# Patient Record
Sex: FEMALE | Race: BLACK OR AFRICAN AMERICAN | Employment: FULL TIME | URBAN - METROPOLITAN AREA
[De-identification: names, ages, dates, MRNs, and addresses within clinical notes are randomized per-mention and may not be internally consistent; named-entity substitution may affect disease eponyms.]

---

## 2017-01-24 ENCOUNTER — OFFICE VISIT - HEALTHEAST (OUTPATIENT)
Dept: MIDWIFE SERVICES | Facility: CLINIC | Age: 27
End: 2017-01-24

## 2017-01-24 DIAGNOSIS — Z30.09 GENERAL COUNSELING FOR INITIATION OF OTHER CONTRACEPTIVE MEASURES: ICD-10-CM

## 2017-01-24 ASSESSMENT — MIFFLIN-ST. JEOR: SCORE: 1563.17

## 2017-02-24 ENCOUNTER — OFFICE VISIT - HEALTHEAST (OUTPATIENT)
Dept: FAMILY MEDICINE | Facility: CLINIC | Age: 27
End: 2017-02-24

## 2017-02-24 ENCOUNTER — COMMUNICATION - HEALTHEAST (OUTPATIENT)
Dept: INTERNAL MEDICINE | Facility: CLINIC | Age: 27
End: 2017-02-24

## 2017-02-24 ENCOUNTER — COMMUNICATION - HEALTHEAST (OUTPATIENT)
Dept: SCHEDULING | Facility: CLINIC | Age: 27
End: 2017-02-24

## 2017-02-24 DIAGNOSIS — K52.9 AGE (ACUTE GASTROENTERITIS): ICD-10-CM

## 2017-02-24 DIAGNOSIS — K52.9 GASTROENTERITIS: ICD-10-CM

## 2017-02-24 DIAGNOSIS — R11.0 NAUSEA: ICD-10-CM

## 2017-04-05 ENCOUNTER — AMBULATORY - HEALTHEAST (OUTPATIENT)
Dept: INTERNAL MEDICINE | Facility: CLINIC | Age: 27
End: 2017-04-05

## 2017-04-05 ENCOUNTER — COMMUNICATION - HEALTHEAST (OUTPATIENT)
Dept: INTERNAL MEDICINE | Facility: CLINIC | Age: 27
End: 2017-04-05

## 2017-04-05 ENCOUNTER — AMBULATORY - HEALTHEAST (OUTPATIENT)
Dept: LAB | Facility: CLINIC | Age: 27
End: 2017-04-05

## 2017-04-05 DIAGNOSIS — Z00.00 HEALTH CARE MAINTENANCE: ICD-10-CM

## 2017-06-09 ENCOUNTER — OFFICE VISIT - HEALTHEAST (OUTPATIENT)
Dept: INTERNAL MEDICINE | Facility: CLINIC | Age: 27
End: 2017-06-09

## 2017-06-09 DIAGNOSIS — J03.90 ACUTE TONSILLITIS: ICD-10-CM

## 2017-06-09 DIAGNOSIS — J02.9 SORE THROAT: ICD-10-CM

## 2017-06-09 ASSESSMENT — MIFFLIN-ST. JEOR: SCORE: 1522.35

## 2017-07-27 ENCOUNTER — AMBULATORY - HEALTHEAST (OUTPATIENT)
Dept: NURSING | Facility: CLINIC | Age: 27
End: 2017-07-27

## 2017-07-27 ENCOUNTER — AMBULATORY - HEALTHEAST (OUTPATIENT)
Dept: INTERNAL MEDICINE | Facility: CLINIC | Age: 27
End: 2017-07-27

## 2017-07-27 ENCOUNTER — COMMUNICATION - HEALTHEAST (OUTPATIENT)
Dept: INTERNAL MEDICINE | Facility: CLINIC | Age: 27
End: 2017-07-27

## 2017-07-27 DIAGNOSIS — Z23 IMMUNIZATION DUE: ICD-10-CM

## 2017-09-20 ENCOUNTER — OFFICE VISIT - HEALTHEAST (OUTPATIENT)
Dept: FAMILY MEDICINE | Facility: CLINIC | Age: 27
End: 2017-09-20

## 2017-09-20 DIAGNOSIS — M54.2 TENDERNESS OF NECK: ICD-10-CM

## 2017-09-20 DIAGNOSIS — R51.9 HEADACHE: ICD-10-CM

## 2017-09-20 DIAGNOSIS — M62.838 NECK MUSCLE SPASM: ICD-10-CM

## 2017-10-18 ENCOUNTER — OFFICE VISIT - HEALTHEAST (OUTPATIENT)
Dept: MIDWIFE SERVICES | Facility: CLINIC | Age: 27
End: 2017-10-18

## 2017-10-18 DIAGNOSIS — Z01.812 PRE-PROCEDURAL LABORATORY EXAMINATION: ICD-10-CM

## 2017-10-18 DIAGNOSIS — N94.2 VAGINISMUS: ICD-10-CM

## 2017-10-18 DIAGNOSIS — Z30.430 ENCOUNTER FOR IUD INSERTION: ICD-10-CM

## 2017-10-18 DIAGNOSIS — Z11.3 SCREEN FOR STD (SEXUALLY TRANSMITTED DISEASE): ICD-10-CM

## 2017-10-18 ASSESSMENT — MIFFLIN-ST. JEOR: SCORE: 1508.74

## 2017-10-20 ENCOUNTER — COMMUNICATION - HEALTHEAST (OUTPATIENT)
Dept: SCHEDULING | Facility: CLINIC | Age: 27
End: 2017-10-20

## 2017-10-22 ENCOUNTER — HEALTH MAINTENANCE LETTER (OUTPATIENT)
Age: 27
End: 2017-10-22

## 2018-03-16 ENCOUNTER — OFFICE VISIT - HEALTHEAST (OUTPATIENT)
Dept: MIDWIFE SERVICES | Facility: CLINIC | Age: 28
End: 2018-03-16

## 2018-03-16 DIAGNOSIS — F32.A DEPRESSION: ICD-10-CM

## 2018-03-16 ASSESSMENT — MIFFLIN-ST. JEOR: SCORE: 1481.98

## 2018-03-19 ENCOUNTER — COMMUNICATION - HEALTHEAST (OUTPATIENT)
Dept: MIDWIFE SERVICES | Facility: CLINIC | Age: 28
End: 2018-03-19

## 2018-03-22 ENCOUNTER — COMMUNICATION - HEALTHEAST (OUTPATIENT)
Dept: FAMILY MEDICINE | Facility: CLINIC | Age: 28
End: 2018-03-22

## 2018-03-23 ENCOUNTER — COMMUNICATION - HEALTHEAST (OUTPATIENT)
Dept: FAMILY MEDICINE | Facility: CLINIC | Age: 28
End: 2018-03-23

## 2018-05-04 ENCOUNTER — AMBULATORY - HEALTHEAST (OUTPATIENT)
Dept: MIDWIFE SERVICES | Facility: CLINIC | Age: 28
End: 2018-05-04

## 2018-05-04 ENCOUNTER — COMMUNICATION - HEALTHEAST (OUTPATIENT)
Dept: INTERNAL MEDICINE | Facility: CLINIC | Age: 28
End: 2018-05-04

## 2018-05-04 ENCOUNTER — COMMUNICATION - HEALTHEAST (OUTPATIENT)
Dept: MIDWIFE SERVICES | Facility: CLINIC | Age: 28
End: 2018-05-04

## 2018-05-04 DIAGNOSIS — F32.A DEPRESSION: ICD-10-CM

## 2018-08-24 ENCOUNTER — COMMUNICATION - HEALTHEAST (OUTPATIENT)
Dept: MIDWIFE SERVICES | Facility: CLINIC | Age: 28
End: 2018-08-24

## 2018-08-27 ENCOUNTER — OFFICE VISIT - HEALTHEAST (OUTPATIENT)
Dept: MIDWIFE SERVICES | Facility: CLINIC | Age: 28
End: 2018-08-27

## 2018-08-27 DIAGNOSIS — Z11.3 ROUTINE SCREENING FOR STI (SEXUALLY TRANSMITTED INFECTION): ICD-10-CM

## 2018-08-27 DIAGNOSIS — N89.8 VAGINAL DISCHARGE: ICD-10-CM

## 2018-08-27 DIAGNOSIS — T83.9XXA IUD COMPLICATION (H): ICD-10-CM

## 2018-08-27 DIAGNOSIS — N94.2 VAGINISMUS: ICD-10-CM

## 2018-08-27 LAB
CLUE CELLS: NORMAL
HIV 1+2 AB+HIV1 P24 AG SERPL QL IA: NEGATIVE
TRICHOMONAS, WET PREP: NORMAL
YEAST, WET PREP: NORMAL

## 2018-08-27 ASSESSMENT — MIFFLIN-ST. JEOR: SCORE: 1442.52

## 2018-08-28 LAB
C TRACH DNA SPEC QL PROBE+SIG AMP: NEGATIVE
N GONORRHOEA DNA SPEC QL NAA+PROBE: NEGATIVE
T PALLIDUM AB SER QL: NEGATIVE

## 2019-03-11 ENCOUNTER — OFFICE VISIT - HEALTHEAST (OUTPATIENT)
Dept: MIDWIFE SERVICES | Facility: CLINIC | Age: 29
End: 2019-03-11

## 2019-03-11 DIAGNOSIS — N89.8 VAGINAL DISCHARGE: ICD-10-CM

## 2019-03-11 DIAGNOSIS — Z83.3 FAMILY HISTORY OF DIABETES MELLITUS: ICD-10-CM

## 2019-03-11 LAB
CLUE CELLS: NORMAL
TRICHOMONAS, WET PREP: NORMAL
YEAST, WET PREP: NORMAL

## 2019-03-11 ASSESSMENT — MIFFLIN-ST. JEOR: SCORE: 1486.06

## 2019-03-12 LAB — HBA1C MFR BLD: 5.6 % (ref 4.2–6.1)

## 2019-12-02 ENCOUNTER — COMMUNICATION - HEALTHEAST (OUTPATIENT)
Dept: MIDWIFE SERVICES | Facility: CLINIC | Age: 29
End: 2019-12-02

## 2020-05-27 ENCOUNTER — VIRTUAL VISIT (OUTPATIENT)
Dept: FAMILY MEDICINE | Facility: OTHER | Age: 30
End: 2020-05-27

## 2020-05-28 NOTE — PROGRESS NOTES
"Date: 2020 13:37:56  Clinician: Reese Porras  Clinician NPI: [id number]  Patient: layla daily  Patient : 1990  Patient Address: [facility] [place] [address]  Patient Phone: [phone]  Visit Protocol: URI  Patient Summary:  layla is a 30 year old ( : 1990 ) female who initiated a Visit for COVID-19 (Coronavirus) evaluation and screening. When asked the question \"Please sign me up to receive news, health information and promotions. \", layla responded \"Yes\".    Her symptoms consist of malaise and myalgia.   layla denies having wheezing, nausea, teeth pain, ageusia, diarrhea, sore throat, enlarged lymph nodes, anosmia, facial pain or pressure, fever, cough, nasal congestion, vomiting, rhinitis, ear pain, headache, and chills. She also denies having recent facial or sinus surgery in the past 60 days and taking antibiotic medication for the symptoms. She is not experiencing dyspnea.   Precipitating events  She has not recently been exposed to someone with influenza. layla has been in close contact with the following high risk individuals: adults 65 or older and children under the age of 5.   Pertinent COVID-19 (Coronavirus) information  In the past 14 days, layla has not worked in a congregate living setting.   She either works or volunteers as a healthcare worker or a , or works or volunteers in a healthcare facility. 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 She lives with a healthcare worker.   layla has had a close contact with a laboratory-confirmed COVID-19 patient within 14 days of symptom onset. She was not exposed at her work. Additional information about contact with COVID-19 (Coronavirus) patient as reported by the patient (free text): My neighbor, extended family member   Pertinent medical history  layla " does not get yeast infections when she takes antibiotics.   layla needs a return to work/school note.   Weight: 154 lbs   layla does not smoke or use smokeless tobacco.   She denies pregnancy and denies breastfeeding. She has menstruated in the past month.   Weight: 154 lbs    MEDICATIONS: isotretinoin oral, ALLERGIES: NKDA  Clinician Response:  Dear layla,      Your symptoms show that you may have coronavirus (COVID-19). This illness can cause fever, cough and trouble breathing. Many people get a mild case and get better on their own. Some people can get very sick.  What should I do?  We would like to test you for this virus. This will be a curbside test done outside the clinic.  Please call [phone] to schedule your visit. Explain that you were referred by [facility] to have a COVID-19 test. Be [facility].  Starting now:  Stay at least 6 feet away from others. (If someone will drive you to your test, stay in the backseat, as far away from the  as you can.)   Don't go to work, school or anywhere else. When it's time for your test, go straight to the testing site. Don't make any stops on the way there or back.   Wash your hands and face often. Use soap and water.   Cover your mouth and nose with a mask, tissue or washcloth.   Don't touch anyone. No hugging, kissing or handshakes.  While at home   Stay home and away from others (self-isolate) until:  You've had no fever---and no medicine that reduces fever---for 3 full days (72 hours). And...  Your other symptoms have gotten better. For example, your cough or breathing has improved. And...  At least 10 days have passed since your symptoms started.  During this time:  Stay in your own room (and use your own bathroom), if you can.  Don't go to work, school or anywhere else.  Stay away from others in your home. No hugging, kissing or shaking hands.  Don't let anyone visit.  Cover your mouth and nose with a mask, tissue or washcloth to  "avoid spreading germs.  Clean \"high touch\" surfaces often (doorknobs, counters, handles, etc.). Use a household cleaning spray or wipes.  Wash your hands and face often. Use soap and water.  How can I take care of myself?  1. Get lots of rest. Drink extra fluids (unless your doctor has told you not to).  2. Take Tylenol (acetaminophen) for fever or pain. If you have liver or kidney problems, ask your family doctor if it's okay to take Tylenol.  Adults can take either:   650 mg (two 325 mg pills) every 4 to 6 hours, or...  1,000 mg (two 500 mg pills) every 8 hours as needed.   Note: Don't take more than 3,000 mg in one day.   Acetaminophen is found in many medicines (both prescribed and over-the-counter medicines). Read all labels to be sure you don't take too much.   For children, check the Tylenol bottle for the right dose. The dose is based on the child's age or weight.  3. If you have other health problems (like cancer, heart failure, an organ transplant or severe kidney disease): Call your specialty clinic if you don't feel better in the next 2 days.  4. Know when to call 911: If your breathing is so bad that it keeps you from doing normal activities, call 911 or go to the emergency room. Tell them that you've been staying home and may have COVID-19.  5. Sign up for [employer]. We know it's scary to hear that you might have COVID-19. We want to track your symptoms to make sure you're okay over the next 2 weeks. Please look for an email from [employer]---this is a free, online program that we'll use to keep in touch. To sign up, follow the link in the email. Learn more at [email or web address].  6. The following will serve as your written order for this Covid Test ordered by me for the indication of suspected Covid [Z20.828]: The test will be ordered in [employer], our electronic health record after you are scheduled and will show as ordered and authorized by Jonathan Sher MD   Order: Covid-19 (Coronavirus) " PCR for SYMPTOMATIC testing from [facility]  Where can I get more information?  To learn more about COVID-19 and how to care for yourself at home, please visit the CDC website at https://www.cdc.gov/coronavirus/2019-ncov/about/steps-when-sick.html.  For more about your care at [facility], please visit [email or web address]  If you'd like to be part of a COVID-19 clinical trial (research study) at the [facility], go to [email or web address]-clinical-trials for details.    Diagnosis: Myalgia  Diagnosis ICD: M79.1

## 2020-05-29 ENCOUNTER — AMBULATORY - HEALTHEAST (OUTPATIENT)
Dept: FAMILY MEDICINE | Facility: CLINIC | Age: 30
End: 2020-05-29

## 2020-05-29 DIAGNOSIS — Z20.822 SUSPECTED COVID-19 VIRUS INFECTION: ICD-10-CM

## 2020-06-02 ENCOUNTER — AMBULATORY - HEALTHEAST (OUTPATIENT)
Dept: FAMILY MEDICINE | Facility: CLINIC | Age: 30
End: 2020-06-02

## 2020-06-02 DIAGNOSIS — Z20.822 SUSPECTED COVID-19 VIRUS INFECTION: ICD-10-CM

## 2020-06-03 ENCOUNTER — COMMUNICATION - HEALTHEAST (OUTPATIENT)
Dept: FAMILY MEDICINE | Facility: CLINIC | Age: 30
End: 2020-06-03

## 2020-10-26 ENCOUNTER — VIRTUAL VISIT (OUTPATIENT)
Dept: FAMILY MEDICINE | Facility: OTHER | Age: 30
End: 2020-10-26

## 2020-10-26 NOTE — PROGRESS NOTES
"Date: 10/26/2020 00:13:23  Clinician: Juana Lazcano  Clinician NPI: [id number]  Patient: layla daily  Patient : 1990  Patient Address: [place] [address]  Patient Phone: [phone]  Visit Protocol: URI  Patient Summary:  layla is a 30 year old ( : 1990 ) female who initiated a [facility] Visit for COVID-19 (Coronavirus) evaluation and screening. When asked the question \"Please sign me up to receive news, health information and promotions. \", layla responded \"No\".    layla states her symptoms started 1-2 days ago.   Her symptoms consist of a headache, a cough, nasal congestion, anosmia, rhinitis, myalgia, chills, malaise, a sore throat, and ageusia.   Symptom details     Nasal secretions: The color of her mucus is white and clear.    Cough: layla coughs a few times an hour and her cough is more bothersome at night. Phlegm does not come into her throat when she coughs. She does not believe her cough is caused by post-nasal drip.     Sore throat: layla reports having moderate throat pain (4-6 on a 10 point pain scale), does not have exudate on her tonsils, and can swallow liquids. She is not sure if the lymph nodes in her neck are enlarged. A rash has not appeared on the skin since the sore throat started.     Headache: She states the headache is moderate (4-6 on a 10 point pain scale).      layla denies having ear pain, wheezing, fever, vomiting, nausea, facial pain or pressure, teeth pain, and diarrhea. She also denies taking antibiotic medication in the past month and having recent facial or sinus surgery in the past 60 days. She is not experiencing dyspnea.   Precipitating events  Within the past week, layla has not been exposed to someone with strep throat. She has not recently been exposed to someone with influenza. layla has been in close contact with the following high risk individuals: adults 65 or older, people with asthma, heart disease or diabetes, and children under the age of 5.   " Pertinent COVID-19 (Coronavirus) information  In the past 14 days, layla has not worked in a congregate living setting.   She either works or volunteers as a healthcare worker or a , or works or volunteers in a healthcare facility. 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 She lives with a healthcare worker.   layla has not had a close contact with a laboratory-confirmed COVID-19 patient within 14 days of symptom onset.   Since December 2019, layla and has not had upper respiratory infection or influenza-like illness. Has not been diagnosed with lab-confirmed COVID-19 test   Pertinent medical history  layla does not get yeast infections when she takes antibiotics.   layla needs a return to work/school note.   Weight: 165 lbs   layla does not smoke or use smokeless tobacco.   She denies pregnancy and denies breastfeeding. She has menstruated in the past month.   Weight: 165 lbs    MEDICATIONS: isotretinoin oral, ALLERGIES: NKDA  Clinician Response:  Dear layla,   Your symptoms show that you may have coronavirus (COVID-19). This illness can cause fever, cough and trouble breathing. Many people get a mild case and get better on their own. Some people can get very sick.  What should I do?  We would like to test you for this virus.   1. Please call [phone] to schedule your visit. Explain that [facility]. Be ready to share your [facility] visit ID number.  Please note that if you are assessed for Covid-19 testing and receive an order for testing from [facility], that the scheduling of your Covid test at [facility] may be delayed by three or four days or more due to limited availability for testing. Additional options for testing can be found on the [place] Covid-19 Response website. https://[place].gov/covid19/    The  "following will serve as your written order for this COVID Test, ordered by me, for the indication of suspected COVID [Z20.828]: The test will be ordered in [employer], our electronic health record, after you are scheduled. It will show as ordered and authorized by Brandon Sher MD.  Order: COVID-19 (Coronavirus) PCR for [facility]MPTOMATIC [facility].   2. When it's time for your COVID test:  Stay at least 6 feet away from others. (If someone will drive you to your test, stay in the backseat, as far away from the  as you can.)   Cover your mouth and nose with a mask, tissue or washcloth.  Go straight to the testing site. Don't make any stops on the way there or back.      3.Starting now: Stay home and away from others (self-isolate) until:   You've had no fever---and no medicine that reduces fever---for one full day (24 hours). And...   Your other symptoms have gotten better. For example, your cough or breathing has improved. And...   At least 10 days have passed since your symptoms started.       During this time, don't leave the house except for testing or medical care.   Stay in your own room, even for meals. Use your own bathroom if you can.   Stay away from others in your home. No hugging, kissing or shaking hands. No visitors.  Don't go to work, school or anywhere else.    Clean \"high touch\" surfaces often (doorknobs, counters, handles, etc.). Use a household cleaning spray or wipes. You'll find a full list of  on the EPA website: www.epa.gov/pesticide-registration/list-n-disinfectants-use-against-sars-cov-2.   Cover your mouth and nose with a mask, tissue or washcloth to avoid spreading germs.  Wash your hands and face often. Use soap and water.  Caregivers in these groups are at risk for severe illness due to COVID-19:  o People 65 years and older  o People who live in a nursing home or long-term care facility  o People with chronic disease (lung, heart, cancer, diabetes, kidney, liver, immunologic)  " o People who have a weakened immune system, including those who:   Are in cancer treatment  Take medicine that weakens the immune system, such as corticosteroids  Had a bone marrow or organ transplant  Have an immune deficiency  Have poorly controlled HIV or AIDS  Are obese (body mass index of 40 or higher)  Smoke regularly   o Caregivers should wear gloves while washing dishes, handling laundry and cleaning bedrooms and bathrooms.  o Use caution when washing and drying laundry: Don't shake dirty laundry, and use the warmest water setting that you can.  o For more tips, go to www.cdc.gov/coronavirus/2019-ncov/downloads/10Things.pdf.    4.Sign up for [employer]. We know it's scary to hear that you might have COVID-19. We want to track your symptoms to make sure you're okay over the next 2 weeks. Please look for an email from [employer]---this is a free, online program that we'll use to keep in touch. To sign up, follow the link in the email. Learn more at [email or web address]  How can I take care of myself?   Get lots of rest. Drink extra fluids (unless a doctor has told you not to).   Take Tylenol (acetaminophen) for fever or pain. If you have liver or kidney problems, ask your family doctor if it's okay to take Tylenol.   Adults can take either:    650 mg (two 325 mg pills) every 4 to 6 hours, or...   1,000 mg (two 500 mg pills) every 8 hours as needed.    Note: Don't take more than 3,000 mg in one day. Acetaminophen is found in many medicines (both prescribed and over-the-counter medicines). Read all labels to be sure you don't take too much.   For children, check the Tylenol bottle for the right dose. The dose is based on the child's age or weight.    If you have other health problems (like cancer, heart failure, an organ transplant or severe kidney disease): Call your specialty clinic if you don't feel better in the next 2 days.       Know when to call 911. Emergency warning signs include:     Trouble breathing or shortness of breath Pain or pressure in the chest that doesn't go away Feeling confused like you haven't felt before, or not being able to wake up Bluish-colored lips or face.  Where can I get more information?   [facility] -- About COVID-19: [email or web address]   CDC -- What to Do If You're Sick: www.cdc.gov/coronavirus/2019-ncov/about/steps-when-sick.html   CDC -- Ending Home Isolation: www.cdc.gov/coronavirus/2019-ncov/hcp/disposition-in-home-patients.html   CDC -- Caring for Someone: www.cdc.gov/coronavirus/2019-ncov/if-you-are-sick/care-for-someone.html   [facility] -- Interim Guidance for Hospital Discharge to Home: www.[facility].[place]/diseases/coronavirus/hcp/hospdischarge.pdf   [facility] clinical trials (COVID-19 research studies): clinicalaffairs.[facility]-clinical-trials    Below are the COVID-19 hotlines at the [place] Department of Health ([facility]). Interpreters are available.    For health questions: Call 730-624-5587 or 1-296.948.2606 (7 a.m. to 7 p.m.) For questions about schools and childcare: Call 797-470-5617 or 1-144.522.9945 (7 a.m. to 7 p.m.)    Diagnosis: Myalgia, unspecified site  Diagnosis ICD: M79.10

## 2020-10-27 DIAGNOSIS — Z20.822 SUSPECTED 2019 NOVEL CORONAVIRUS INFECTION: Primary | ICD-10-CM

## 2021-02-13 ENCOUNTER — OFFICE VISIT (OUTPATIENT)
Dept: URGENT CARE | Facility: URGENT CARE | Age: 31
End: 2021-02-13
Payer: COMMERCIAL

## 2021-02-13 VITALS
BODY MASS INDEX: 25.09 KG/M2 | OXYGEN SATURATION: 100 % | TEMPERATURE: 97.2 F | DIASTOLIC BLOOD PRESSURE: 56 MMHG | HEART RATE: 60 BPM | SYSTOLIC BLOOD PRESSURE: 104 MMHG | WEIGHT: 165 LBS

## 2021-02-13 DIAGNOSIS — H60.332 ACUTE SWIMMER'S EAR OF LEFT SIDE: Primary | ICD-10-CM

## 2021-02-13 PROCEDURE — 99203 OFFICE O/P NEW LOW 30 MIN: CPT | Performed by: PHYSICIAN ASSISTANT

## 2021-02-13 RX ORDER — NEOMYCIN SULFATE, POLYMYXIN B SULFATE, HYDROCORTISONE 3.5; 10000; 1 MG/ML; [USP'U]/ML; MG/ML
3 SOLUTION/ DROPS AURICULAR (OTIC) 4 TIMES DAILY
Qty: 3 ML | Refills: 0 | Status: SHIPPED | OUTPATIENT
Start: 2021-02-13 | End: 2021-02-18

## 2021-02-13 NOTE — PROGRESS NOTES
URGENT CARE VISIT:    SUBJECTIVE:   Pretty Thomason is a 30 year old female presenting with a chief complaint of ear pain left.  Onset was 1 day(s) ago.   She denies the following symptoms: fever, chills, stuffy nose, cough - non-productive, sore throat, vomiting and diarrhea  Course of illness is same.    Treatment measures tried include None tried with no relief of symptoms.  Predisposing factors include None.    PMH: History reviewed. No pertinent past medical history.  Allergies: Patient has no known allergies.   Medications:   Current Outpatient Medications   Medication Sig Dispense Refill     neomycin-polymyxin-hydrocortisone (CORTISPORIN) 3.5-06980-3 otic solution Place 3 drops Into the left ear 4 times daily for 5 days 3 mL 0     polyethylene glycol (MIRALAX) powder Take 17 g by mouth daily. (Patient not taking: Reported on 2/13/2021) 510 g 1     ranitidine (ZANTAC) 150 MG tablet Take 1 tablet by mouth 2 times daily. (Patient not taking: Reported on 2/13/2021) 60 tablet 1     Social History:   Social History     Tobacco Use     Smoking status: Never Smoker     Smokeless tobacco: Never Used   Substance Use Topics     Alcohol use: No       ROS:  Review of systems negative except as stated above.    OBJECTIVE:  /56   Pulse 60   Temp 97.2  F (36.2  C) (Tympanic)   Wt 74.8 kg (165 lb)   LMP 01/30/2021   SpO2 100%   Breastfeeding No   BMI 25.09 kg/m    GENERAL APPEARANCE: healthy, alert and no distress  EYES: EOMI,  PERRL, conjunctiva clear  HENT: TM's normal bilaterally and left external ear canal slightly erythematous. Left tragus TTP.  NECK: supple, nontender, no lymphadenopathy  RESP: lungs clear to auscultation - no rales, rhonchi or wheezes  CV: regular rates and rhythm, normal S1 S2, no murmur noted  SKIN: no suspicious lesions or rashes    ASSESSMENT:    ICD-10-CM    1. Acute swimmer's ear of left side  H60.332 neomycin-polymyxin-hydrocortisone (CORTISPORIN) 3.5-83564-8 otic solution        PLAN:  Patient Instructions   Patient was educated on the natural course of condition. I suspect an early otitis externa. Otitis externa occurs when the ear canal becomes inflamed. Several factors can increas your risk of developing this including swimming, wearing earbuds or hearing aids, and using q tips. Apply medication as prescribed. Conservative measures discussed including avoid using q-tips and over-the-counter analgesics (Tylenol or Ibuprofen). See your primary care provider if symptoms worsen or do not improve in 5 days. Seek emergency care if you develop severe ear pain, swelling, or redness. Patient verbalized understanding and is agreeable to plan. The patient was discharged ambulatory and in stable condition.    Paula Bernardo PA-C ....................  2/13/2021   10:58 AM

## 2021-02-13 NOTE — PATIENT INSTRUCTIONS
Patient was educated on the natural course of condition. Otitis externa occurs when the ear canal becomes inflamed. Several factors can increas your risk of developing this including swimming, wearing earbuds or hearing aids, and using q tips. Apply medication as prescribed. Conservative measures discussed including avoid using q-tips and over-the-counter analgesics (Tylenol or Ibuprofen). See your primary care provider if symptoms worsen or do not improve in 5 days. Seek emergency care if you develop severe ear pain, swelling, or redness.     Patient Education     External Ear Infection (Adult)    External otitis (also called  swimmer s ear ) is an infection in the ear canal. It's often caused by bacteria or fungus. It can occur a few days after water gets trapped in the ear canal (from swimming or bathing). It can also occur after cleaning too deeply in the ear canal with a cotton swab or other object. Sometimes, hair care products get into the ear canal and cause this problem.   Symptoms can include pain, fever, itching, redness, drainage, or swelling of the ear canal. Temporary hearing loss may also occur.   Home care    Don't try to clean the ear canal. This can push pus and bacteria deeper into the canal.    Use prescribed ear drops as directed. These help reduce swelling and fight the infection. If an ear wick was placed in the ear canal, apply drops right onto the end of the wick. The wick will draw the medicine into the ear canal even if it's swollen closed.    A cotton ball may be loosely placed in the outer ear to absorb any drainage.    You may use over-the-counter medicines to control pain as directed by the healthcare provider, unless another medicine was prescribed. Talk with your provider before using these medicines if you have chronic liver or kidney disease or ever had a stomach ulcer or digestive tract bleeding.    Don't allow water to get into your ear when bathing. Also don't swim until the  infection has cleared.    Prevention    Keep your ears dry. This helps lower the risk of infection. Dry your ears with a towel or hair dryer after getting wet. Also, use ear plugs when swimming.    Don't stick any objects in the ear to remove wax.    Talk with your provider about using ear drops to prevent swimmer's ear in case you feel water trapped in your ear canal. You can get these drops over the counter at most drugstores. They work by removing water from the ear canal.    Follow-up care  Follow up with your healthcare provider in 1 week, or as advised.   When to seek medical advice  Call your healthcare provider right away if any of these occur:     Ear pain becomes worse or doesn t improve after 3 days of treatment    Redness or swelling of the outer ear occurs or gets worse    Headache    Fever of 100.4 F (38 C) or higher, or as directed by your healthcare provider  Call 911  Call 911 or get immediate medical care if any of the following occur:     Seizure    Unusual drowsiness or confusion    Unusual painful or stiff neck    [employer] last reviewed this educational content on 8/1/2020 2000-2020 [employer]. [address] [place] [address], [place] [address]. All rights reserved. This information is not intended as a substitute for professional medical care. Always follow your healthcare professional's instructions.

## 2021-03-30 ENCOUNTER — RECORDS - HEALTHEAST (OUTPATIENT)
Dept: ADMINISTRATIVE | Facility: OTHER | Age: 31
End: 2021-03-30

## 2021-05-30 VITALS — BODY MASS INDEX: 25.92 KG/M2 | WEIGHT: 175 LBS | HEIGHT: 69 IN

## 2021-05-30 VITALS — BODY MASS INDEX: 25.81 KG/M2 | WEIGHT: 174.8 LBS

## 2021-05-31 VITALS — HEIGHT: 69 IN | WEIGHT: 163 LBS | BODY MASS INDEX: 24.14 KG/M2

## 2021-05-31 VITALS — WEIGHT: 163.8 LBS | BODY MASS INDEX: 24.19 KG/M2

## 2021-05-31 VITALS — BODY MASS INDEX: 24.59 KG/M2 | HEIGHT: 69 IN | WEIGHT: 166 LBS

## 2021-06-01 VITALS — WEIGHT: 148.4 LBS | HEIGHT: 69 IN | BODY MASS INDEX: 21.98 KG/M2

## 2021-06-01 VITALS — HEIGHT: 69 IN | WEIGHT: 157.1 LBS | BODY MASS INDEX: 23.27 KG/M2

## 2021-06-02 VITALS — HEIGHT: 69 IN | WEIGHT: 158 LBS | BODY MASS INDEX: 23.4 KG/M2
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
## 2021-06-09 NOTE — PROGRESS NOTES
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  Unfortunately, multiple members of her household,  including her 3-year-old child, developed similar symptoms after playing with a  family that also had similar symptoms.  She has not been able to eat anything for  the past 12 to 18 hours.  She is currently 4 months postpartum and is not  breastfeeding.  She does not smoke and she works as a nurse at the [employer].    OBJECTIVE:  VITAL SIGNS:  Temperature of 98.3, pulse 78, respirations 12, blood pressure 112/62.  HEENT:  Normal.  NECK:  Supple.  LUNGS:  Clear.  HEART:  Regular rhythm, normal S1, normal, S2.    ABDOMEN:  Soft, nontender.  No masses.    ASSESSMENT:  1.  Acute gastroenteritis.  Plan Zofran ODT 4 mg q.4 hours p.r.n. nausea, vomiting.  2.  Urine pregnancy test.  3.  Follow up if not improving.      ZAK RICKS MD  pa  PRESTON 02/24/2017 11:54:16  T 02/24/2017 12:14:46  R 02/24/2017 12:14:46  [id number]        cc:ZAK DONNELLY MD

## 2021-06-11 NOTE — PROGRESS NOTES
"  Office Visit - Follow Up   Pretty Thomason   27 y.o. female    Date of Visit: 6/9/2017    Chief Complaint   Patient presents with     Sore Throat     ST, hard to swallow, cough, a lot of drainage, started last night, no fever, body aches        Assessment and Plan   1. Sore throat  Rapid strep is negative.  - Rapid Strep A Screen-Throat  - Group A Strep, RNA Direct Detection, Throat    2. Acute tonsillitis  Despite preliminary rapid strep being negative I will start her on Augmentin 875/125 1 tablet twice per day for 7-10 days.  She seems to me to be at some risk for developing a tonsillar abscess.  She should return to clinic here if her prominent right-sided tonsillar pain is not resolving nicely.          No Follow-up on file.     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  She noted that at 7 PM yesterday she developed a prominent sore throat that was gradually worsening.  He reports no fevers but has had some diffuse aching.  She has no other upper respiratory symptoms.    Review of Systems: A comprehensive review of systems was negative except as noted.     Medications, Allergies and Problem List   Reviewed and updated     Physical Exam   General Appearance:       BP 90/44 (Patient Site: Left Arm, Patient Position: Sitting)  Pulse 92  Ht 5' 9\" (1.753 m)  Wt 166 lb (75.3 kg)  SpO2 98%  BMI 24.51 kg/m2    She has prominent swelling in the right tonsillar area.  She has some exudate in her tonsillar crypts.  She has a adenopathy in the right anterior cervical chain.  Rapid strep test is however negative.       Additional Information   Current Outpatient Prescriptions   Medication Sig Dispense Refill     amoxicillin-clavulanate (AUGMENTIN) 875-125 mg per tablet Take 1 tablet by mouth 2 (two) times a day for 10 days. 20 tablet 0     No current facility-administered medications for this visit.      No " Known Allergies  Social History   Substance Use Topics     Smoking status: Never Smoker     Smokeless tobacco: Never Used     Alcohol use No       Review and/or order of clinical lab tests:  Review and/or order of radiology tests:  Review and/or order of medicine tests:  Discussion of test results with performing physician:  Decision to obtain old records and/or obtain history from someone other than the patient:  Review and summarization of old records and/or obtaining history from someone other than the patient and.or discussion of case with another health care provider:  Independent visualization of image, tracing or specimen itself:    Time: total time spent with the patient was 15 minutes of which >50% was spent in counseling and coordination of care     Eusebio Vogel MD     Patient baseline mental status/Awake/Alert and oriented to person, place and time

## 2021-06-13 NOTE — PROGRESS NOTES
IUD Insertion Procedure Note    Indications: contraception    IUD Information:  Paragard Lot # [id number], Expiration date 277236.    Procedure Details   Urine pregnancy test was done and negative.  Last unprotected IC was > 2 weeks ago.  Just stopped period yesterday.  GC/CT cultures negative on 10/18/17. The risks (including infection, bleeding, pain, and uterine perforation) and benefits of the procedure were explained to the patient and Written informed consent was obtained.  Verbal pre-procedural time out was completed with patient.  Pt noted that she had vaginismus and that speculum exams so was offered lidocaine jelly at introitus which she applied herself prior to procedure.    Cervix noted to have mild ectropian.  Lidocaine jelly applied to anterior cervix. Uterus sounded to 8 cm. IUD inserted without difficulty. String visible and trimmed to 2-3 cm. Patient tolerated procedure well.    Condition:  Stable    Complications:  None    Plan:    1. Taught and encouraged to check her IUD strings monthly, ideally after menses if she has them.   2. She was advised to call for any fever or for prolonged or severe pain or bleeding. Warning signs (P-A-I-N-S) with use of IUD reviewed reviewed and h/o given regarding when to call CNM if problems or concerns.   3. She was advised to use OTC analgesics and heat as needed for discomfort.  4. Recommended condom use if IC is desire or abstinence for one week.   5. RTC in 1-2 months for routine IUD check appointment.     Jessica Regalado, DAINA, CARLEE,DAINA
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
## 2021-06-20 NOTE — LETTER
Letter by Nissen, Lynette, RN at      Author: Nissen, Lynette, RN Service: -- Author Type: --    Filed:  Encounter Date: 6/3/2020 Status: (Other)       6/3/2020        Pretty Thomason  [address]  [facility] [id number]    This letter provides a written record that you were tested for COVID-19 on 6/2/20.     Your result was negative.    This means that we didnt find the virus that causes COVID-19 in your sample. A test may show negative when you do actually have the virus. This can happen when the virus is in the early stages of infection, before you feel illness symptoms.    Even if you dont have symptoms, they may still appear. For safety, its very important to follow these rules.    Keep yourself away from others (self-isolation):      Stay home. Dont go to work, school or anywhere else.     Stay in your own room (and use your own bathroom), if you can.    Stay away from others in your home. No hugging, kissing or shaking hands. No visitors.    Clean high touch surfaces often (doorknobs, counters, handles, etc.). Use a household cleaning spray or wipes.    Cover your mouth and nose with a mask, tissue or washcloth to avoid spreading germs.    Wash your hands and face often with soap and water.    Stay in self-isolation until you meet ALL of the guidelines below:    1. You have had no fever for at least 72 hours (that is 3 full days of no fever without the use of medicine that reduces fevers), AND  2. other symptoms (such as cough, shortness of breath) have gotten better, AND  3. at least 10 days have passed since your symptoms first appeared.    Going back to work  Check with your employer for any guidelines to follow for going back to work.    Employers: This document serves as formal notice that your employee tested negative for COVID-19, as of the testing date shown above.    For questions regarding this letter or your Negative COVID-19 result, call [phone] between 8A to 6:30P (M-F) and  10A to 6:30P (weekends).
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
## 2021-06-24 NOTE — PROGRESS NOTES
Assessment:      Vulvodynia with irritation.      Plan:      Symptomatic local care discussed.  pt requests screening for DM, Hgb A1C ordered today due to family hx of DM     Subjective:       Pretty Thomason is a 28 y.o. female who presents for evaluation of an abnormal vaginal discharge. Symptoms have been present for 5 months off and on. Vaginal symptoms: burning, dyspareunia, local irritation and vulvar itching. Contraception: abstinence. She denies blisters, bumps and discharge Sexually transmitted infection risk: very low risk of STD exposure. Not currently sexually active.  Menstrual flow: regular every 28-30 days. Stopped taking Zoloft when Rx ran out because she did not make an appt with PCP. Discussed talk therapy and consider restarting Zoloft. Pt will call mental health resources available to her through her insurance plan and follow up with PCP prn if Rx needed.  PHQ9 =16 today, pt denies thoughts of self harm or harm to others. She agrees to follow up and seek talk therapy.     The following portions of the patient's history were reviewed and updated as appropriate: allergies, current medications, past family history and problem list.      Review of Systems  Pertinent items are noted in HPI.      Objective:        General Appearance:    Alert, cooperative, no distress, appears stated age   Head:    Normocephalic, without obvious abnormality, atraumatic                                           Abdomen:     Soft, non-tender   Genitalia:    Normal female without lesion, discharge or tenderness   Rectal:    Normal tone, no masses or tenderness; guaiac negative stool   Extremities:   Extremities normal, atraumatic, no cyanosis or edema       Skin:   Skin color, texture, turgor normal, no rashes or lesions       Total time spent 20 min > 50% counseling. Wendy Segal, APRN,CNM

## 2021-06-25 NOTE — PROGRESS NOTES
Progress Notes by Dany Costa DO at 9/20/2017  3:20 PM     Author: Dany Costa DO Service: -- Author Type: Physician    Filed: 9/21/2017  9:20 AM Encounter Date: 9/20/2017 Status: Signed    : Dany Costa DO (Physician)       Chief Complaint   Patient presents with   ? Dizziness     blurred vision. x 2 days   ? Headache        History of Present Illness: Nursing notes reviewed. Patient feels a pressure sensation in the front of her head swhen she bends forward. She feels like she has to work harder to focus form both eyes.  I reviewed the visual acuity screening done at time of exam.  Her vision was near normal.  She does not have a prior history of migraine headaches. She has had a frontal headache for about 2 days. No recent fevers or chills. No nasal congestion.     Review of systems: See history of present illness, otherwise negative.     Current Outpatient Prescriptions   Medication Sig Dispense Refill   ? clindamycin (CLEOCIN T) 1 % external solution      ? doxycycline (MONODOX) 100 MG capsule      ? SULFACLEANSE 8-4 8-4 % Susp      ? cyclobenzaprine (FLEXERIL) 10 MG tablet Take 1 tablet (10 mg total) by mouth 3 (three) times a day as needed for muscle spasms. 15 tablet 0     No current facility-administered medications for this visit.        Past Medical History:   Diagnosis Date   ? History of positive PPD    ? Protein S deficiency    ? Trauma    ? Vaginismus    ? Varicella       Past Surgical History:   Procedure Laterality Date   ? WISDOM TOOTH EXTRACTION        Social History     Social History   ? Marital status:      Spouse name: Ivan Chung   ? Number of children: N/A   ? Years of education: College Graduate     Occupational History   ? Registered Nurse      Social History Main Topics   ? Smoking status: Never Smoker   ? Smokeless tobacco: Never Used   ? Alcohol use No   ? Drug use: No   ? Sexual activity: Yes     Partners: Male     Birth control/ protection: None     Other  Topics Concern   ? None     Social History Narrative    This 23-year-old woman lives with her , Ivan, in [place], [place]. She is originally from [place] and moved to [place] when she was 1 years old. She lived in a refugee camp in [place] until she was 4 years old, at which point she moved to [place]. She lived in [place] for 4 years and now lives in [place]. She is a graduate of [employer] in [place] and has a degree from the [employer] in social work and an undergraduate degree from [employer] in nursing. 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 She has worked there since 2010.   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 This is a new job for her. Her , Ivan, is an  at .               History   Smoking Status   ? Never Smoker   Smokeless Tobacco   ? Never Used      Exam:   Blood pressure 94/62, pulse 62, temperature 98  F (36.7  C), temperature source Oral, resp. rate 12, weight 163 lb 12.8 oz (74.3 kg), last menstrual period 09/11/2017, SpO2 100 %, not currently breastfeeding.    EXAM:   General: Vital signs reviewed. Patient is in some distress due to headache discomfort on initial exam.  After being treated with an injection of ketorolac, she started to have relief of her headache discomfort after about 10 minutes.  She also felt her vision was better. Breathing is non labored appearing. Patient is alert and oriented x 3.   ENT: Tympanic membranes are clear and without injection bilaterally, nasal turbinates show no injection or rhinorrhea, no pharyngeal injection or exudate.  Eyes: PERRL with normal consensual gaze and normal convergence and accommodation.  Neck: supple with tender left submental adenopathy. She also has increased left cervical neck tonicity.  Heart: Normal rate and rhythm without murmur  Lungs: Clear to auscultation with good air flow  bilaterally.  Skin: warm and dry  Patient was agreeable to a rapid strep test being done to assess for strep pharyngitis as a cause for her discomfort.  Recent Results (from the past 24 hour(s))   Rapid Strep A Screen-Throat   Result Value Ref Range    Rapid Strep A Antigen No Group A Strep detected, presumptive negative No Group A Strep detected, presumptive negative    Results from exam reviewed with patient.    Assessment/Plan   1. Tenderness of neck  Rapid Strep A Screen-Throat    Group A Strep, RNA Direct Detection, Throat   2. Headache  Rapid Strep A Screen-Throat    ketorolac injection 60 mg (TORADOL)    Group A Strep, RNA Direct Detection, Throat   3. Neck muscle spasm  cyclobenzaprine (FLEXERIL) 10 MG tablet       Patient Instructions     You can use OTC ibuprofen in 6 hours after discharge, plus the cyclobenzaprine as needed. Caution that the cyclobenzaprine may cause drowsiness. I think you may have a viral illness causing your symptoms. Try to rest. Also see info below. We will notify you if the pending strep study is positive, and send a prescription to your pharmacy for treatment if it is positive. Otherwise, you can assume the test was negative if not contacted over the next 48 hours.    Self-Care for Headaches  Most headaches aren't serious and can be relieved with self-care. But some headaches may be a sign of another health problem like eye trouble or high blood pressure. To find the best treatment, learn what kind of headaches you get. For tension headaches, self-care will usually help. To treat migraines, ask your healthcare provider for advice. It is also possible to get both tension and migraine headaches. Self-care involves relieving the pain and avoiding headache triggers if you can.    Ways to reduce pain and tension  Try these steps:    Apply a cold compress or ice pack to the pain site.    Drink fluids. If nausea makes it hard to drink, try sucking on ice.    Rest. Protect yourself from  "bright light and loud noises.    Calm your emotions by imagining a peaceful scene.    Massage tight neck, shoulder, and head muscles.    To relax muscles, soak in a hot bath or use a hot shower.  Use medicines  Aspirin or aspirin substitutes, such as ibuprofen and acetaminophen, can relieve headache. Remember: Never give aspirin to anyone 18 years old or younger because of the risk of developing Reye syndrome. Use pain medicines only when necessary.  Track your headaches  Keeping a headache diary can help you and your healthcare provider identify what's causing your headaches:    Note when each headache happens.    Identify your activities and the foods you've eaten 6 to 8 hours before the headache began.    Look for any trends or \"triggers.\"  Signs of tension headache  Any of the following can be signs:    Dull pain or feeling of pressure in a tight band around your head    Pain in your neck or shoulders    Headache without a definite beginning or end    Headache after an activity such as driving or working on a computer  Signs of migraine  Any of the following can be signs:    Throbbing pain on one or both sides of your head    Nausea or vomiting    Extreme sensitivity to light, sound, and smells    Bright spots, flashes, or other visual changes    Pain or nausea so severe that you can't continue your daily activities  Call your healthcare provider   If you have any of the following symptoms, contact your healthcare provider:    A headache that lingers after a recent injury or bump to the head.    A fever with a stiff neck or pain when you bend your head toward your chest.    A headache along with slurred speech, changes in your vision, or numbness or weakness in your arms or legs.    A headache for longer than 3 days.    Frequent headaches, especially in the morning.    Headaches with seizures     Seek immediate medical attention if you have a headache that you would call \"the worst headache you have ever had.\" "   Date Last Reviewed: 10/4/2015    1262-6501 [employer]. [address] [place] [address], [place] [address]. All rights reserved. This information is not intended as a substitute for professional medical care. Always follow your healthcare professional's instructions.           Dany Costa DO

## 2021-07-14 PROBLEM — Z30.430 ENCOUNTER FOR IUD INSERTION: Status: RESOLVED | Noted: 2017-10-18 | Resolved: 2019-03-11

## 2021-07-14 PROBLEM — Z30.09 GENERAL COUNSELING FOR INITIATION OF OTHER CONTRACEPTIVE MEASURES: Status: RESOLVED | Noted: 2017-01-24 | Resolved: 2019-03-11

## 2021-08-06 ENCOUNTER — TRANSFERRED RECORDS (OUTPATIENT)
Dept: HEALTH INFORMATION MANAGEMENT | Facility: CLINIC | Age: 31
End: 2021-08-06

## 2021-08-07 ENCOUNTER — HEALTH MAINTENANCE LETTER (OUTPATIENT)
Age: 31
End: 2021-08-07

## 2021-10-02 ENCOUNTER — HEALTH MAINTENANCE LETTER (OUTPATIENT)
Age: 31
End: 2021-10-02

## 2022-04-23 ENCOUNTER — OFFICE VISIT (OUTPATIENT)
Dept: FAMILY MEDICINE | Facility: CLINIC | Age: 32
End: 2022-04-23
Payer: COMMERCIAL

## 2022-04-23 VITALS
HEART RATE: 93 BPM | TEMPERATURE: 100.5 F | SYSTOLIC BLOOD PRESSURE: 100 MMHG | WEIGHT: 150 LBS | BODY MASS INDEX: 22.15 KG/M2 | DIASTOLIC BLOOD PRESSURE: 64 MMHG | RESPIRATION RATE: 20 BRPM | OXYGEN SATURATION: 100 %

## 2022-04-23 DIAGNOSIS — J11.1 INFLUENZA-LIKE ILLNESS: Primary | ICD-10-CM

## 2022-04-23 DIAGNOSIS — R53.83 FATIGUE, UNSPECIFIED TYPE: ICD-10-CM

## 2022-04-23 DIAGNOSIS — Z20.828 EXPOSURE TO INFLUENZA: ICD-10-CM

## 2022-04-23 DIAGNOSIS — M79.10 MYALGIA: ICD-10-CM

## 2022-04-23 DIAGNOSIS — R50.9 FEVER IN ADULT: ICD-10-CM

## 2022-04-23 LAB
DEPRECATED S PYO AG THROAT QL EIA: NEGATIVE
FLUAV AG SPEC QL IA: NEGATIVE
FLUBV AG SPEC QL IA: NEGATIVE
GROUP A STREP BY PCR: NOT DETECTED

## 2022-04-23 PROCEDURE — 87804 INFLUENZA ASSAY W/OPTIC: CPT | Performed by: NURSE PRACTITIONER

## 2022-04-23 PROCEDURE — 87651 STREP A DNA AMP PROBE: CPT | Performed by: NURSE PRACTITIONER

## 2022-04-23 PROCEDURE — 99214 OFFICE O/P EST MOD 30 MIN: CPT | Performed by: NURSE PRACTITIONER

## 2022-04-23 RX ORDER — ACETAMINOPHEN 325 MG/1
975 TABLET ORAL ONCE
Status: COMPLETED | OUTPATIENT
Start: 2022-04-23 | End: 2022-04-23

## 2022-04-23 RX ORDER — OSELTAMIVIR PHOSPHATE 75 MG/1
75 CAPSULE ORAL 2 TIMES DAILY
Qty: 10 CAPSULE | Refills: 0 | Status: SHIPPED | OUTPATIENT
Start: 2022-04-23 | End: 2022-04-28

## 2022-04-23 RX ADMIN — ACETAMINOPHEN 325 MG: 325 TABLET ORAL at 10:46

## 2022-04-23 ASSESSMENT — ENCOUNTER SYMPTOMS
SHORTNESS OF BREATH: 0
APPETITE CHANGE: 1
SORE THROAT: 1
FATIGUE: 1
WHEEZING: 0
DYSURIA: 0
FEVER: 1
COUGH: 0
HEADACHES: 1
CHILLS: 1
RHINORRHEA: 1
ACTIVITY CHANGE: 1

## 2022-04-23 NOTE — PROGRESS NOTES
Patient presents with:  Cold Symptoms: Cough, sore throat, body aches, chills, fever started last night       Clinical Decision Making: Focused exam fatigue appearing patient, significant nasal congestion with postnasal drip, erythemic pharynx with mild swelling, however lung sounds clear throughout. Rapid flu negative. Rapid strep negative, culture process reviewed.     Discussed with patient given clinical presentation and exposure to influenza A with daughter at home will empirically treat with a course of Tamiflu. Discussed with patient risks and side effects of nausea and emesis symptoms and encouraged close monitoring for symptom improvement. Reviewed red flag symptoms of viral illness and when to present for reevaluation. Education provided.      ICD-10-CM    1. Influenza-like illness  J11.1 oseltamivir (TAMIFLU) 75 MG capsule   2. Exposure to influenza  Z20.828    3. Myalgia  M79.10 Influenza A & B Antigen     oseltamivir (TAMIFLU) 75 MG capsule   4. Fatigue, unspecified type  R53.83 Streptococcus A Rapid Screen w/Reflex to PCR - Clinic Collect     Group A Streptococcus PCR Throat Swab   5. Fever in adult  R50.9 acetaminophen (TYLENOL) tablet 975 mg       There are no Patient Instructions on file for this visit.    HPI: Pretty Thomason is a 32 year old female who presents today complaining of sudden onset fevers, chills, sore throat and ongoing persistent cough for the past 1 week with increased fatigue.  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  She does endorse positive influenza A exposure with young child that was recently positive.  Patient reports taking 800 mg of ibuprofen OTC overnight while at work with some limited relief.  Reports overall decreased appetite, and increased fatigue with no diarrhea or dysuria symptoms. Patient is vaccinated against COVID.    History obtained from the patient.  LMP:4/22/2022    Problem List:  2017-10: Encounter for IUD  insertion  2017: General counseling for initiation of other contraceptive   measures  2016-10: Pregnant  2016-10:  (normal spontaneous vaginal delivery)  2016-: Supervision of other normal pregnancy  -: Female fertility problem  2013: Constipation  Excessive Weight Gain In Pregnancy - Antepartum Condition Or Prior   Complicated Delivery  Second-degree Perineal Laceration During Delivery  Laceration Of Periurethral Tissue During Delivery      History reviewed. No pertinent past medical history.    Social History     Tobacco Use     Smoking status: Never Smoker     Smokeless tobacco: Never Used   Substance Use Topics     Alcohol use: No       Review of Systems   Constitutional: Positive for activity change, appetite change, chills, fatigue and fever.   HENT: Positive for congestion, rhinorrhea and sore throat.    Respiratory: Negative for cough, shortness of breath and wheezing.    Genitourinary: Negative for dysuria.   Neurological: Positive for headaches.       Vitals:    22 1015   BP: 100/64   BP Location: Left arm   Patient Position: Sitting   Cuff Size: Adult Large   Pulse: 93   Resp: 20   Temp: (!) 100.5  F (38.1  C)   TempSrc: Oral   SpO2: 100%   Weight: 68 kg (150 lb)       Physical Exam  Constitutional:       General: She is not in acute distress.     Appearance: She is ill-appearing. She is not toxic-appearing or diaphoretic.   HENT:      Head: Normocephalic and atraumatic.      Right Ear: Tympanic membrane, ear canal and external ear normal.      Left Ear: Tympanic membrane, ear canal and external ear normal.      Nose: Congestion and rhinorrhea present.      Mouth/Throat:      Mouth: Mucous membranes are moist.      Pharynx: Posterior oropharyngeal erythema present. No oropharyngeal exudate.   Eyes:      General: No scleral icterus.        Right eye: No discharge.         Left eye: No discharge.      Conjunctiva/sclera: Conjunctivae normal.      Pupils: Pupils are equal, round, and  reactive to light.   Cardiovascular:      Rate and Rhythm: Normal rate and regular rhythm.      Pulses: Normal pulses.      Heart sounds: Normal heart sounds.   Pulmonary:      Effort: Pulmonary effort is normal.      Breath sounds: Normal breath sounds.   Lymphadenopathy:      Cervical: Cervical adenopathy present.   Skin:     General: Skin is warm.      Capillary Refill: Capillary refill takes less than 2 seconds.      Findings: No rash.   Neurological:      Mental Status: She is alert and oriented to person, place, and time.   Psychiatric:         Behavior: Behavior normal.         Labs:  Results for orders placed or performed in visit on 04/23/22   Influenza A & B Antigen     Status: Normal    Specimen: Nose; Swab   Result Value Ref Range    Influenza A antigen Negative Negative    Influenza B antigen Negative Negative    Narrative    Test results must be correlated with clinical data. If necessary, results should be confirmed by a molecular assay or viral culture.   Streptococcus A Rapid Screen w/Reflex to PCR - Clinic Collect     Status: Normal    Specimen: Throat; Swab   Result Value Ref Range    Group A Strep antigen Negative Negative         At the end of the encounter, I discussed results, diagnosis, medications. Discussed red flags for immediate return to clinic/ER, as well as indications for follow up if no improvement. Patient understood and agreed to plan.     CARLEE Vincent CNP

## 2022-08-28 ENCOUNTER — HEALTH MAINTENANCE LETTER (OUTPATIENT)
Age: 32
End: 2022-08-28

## 2022-12-06 PROBLEM — K64.9 HEMORRHOIDS: Status: ACTIVE | Noted: 2022-12-06

## 2022-12-06 PROBLEM — S92.919A CLOSED FRACTURE OF ONE OR MORE PHALANGES OF FOOT: Status: ACTIVE | Noted: 2022-12-06

## 2022-12-06 PROBLEM — B07.8 OTHER VIRAL WARTS: Status: ACTIVE | Noted: 2022-12-06

## 2022-12-06 PROBLEM — M79.609 PAIN IN LIMB: Status: ACTIVE | Noted: 2022-12-06

## 2022-12-06 PROBLEM — K52.9 AGE (ACUTE GASTROENTERITIS): Status: ACTIVE | Noted: 2017-02-24

## 2022-12-06 PROBLEM — F32.A DEPRESSION: Status: ACTIVE | Noted: 2018-03-16

## 2022-12-07 ENCOUNTER — OFFICE VISIT (OUTPATIENT)
Dept: MIDWIFE SERVICES | Facility: CLINIC | Age: 32
End: 2022-12-07
Payer: COMMERCIAL

## 2022-12-07 VITALS
WEIGHT: 156 LBS | SYSTOLIC BLOOD PRESSURE: 101 MMHG | TEMPERATURE: 98.6 F | BODY MASS INDEX: 23.04 KG/M2 | DIASTOLIC BLOOD PRESSURE: 58 MMHG | HEART RATE: 60 BPM

## 2022-12-07 DIAGNOSIS — Z12.4 SCREENING FOR MALIGNANT NEOPLASM OF CERVIX: ICD-10-CM

## 2022-12-07 DIAGNOSIS — B37.31 YEAST INFECTION OF THE VAGINA: ICD-10-CM

## 2022-12-07 DIAGNOSIS — R30.0 DYSURIA: Primary | ICD-10-CM

## 2022-12-07 DIAGNOSIS — N89.8 VAGINAL IRRITATION: ICD-10-CM

## 2022-12-07 LAB
ALBUMIN UR-MCNC: NEGATIVE MG/DL
APPEARANCE UR: CLEAR
BACTERIA #/AREA URNS HPF: ABNORMAL /HPF
BILIRUB UR QL STRIP: NEGATIVE
CLUE CELLS: ABNORMAL
COLOR UR AUTO: YELLOW
GLUCOSE UR STRIP-MCNC: NEGATIVE MG/DL
HGB UR QL STRIP: ABNORMAL
KETONES UR STRIP-MCNC: NEGATIVE MG/DL
LEUKOCYTE ESTERASE UR QL STRIP: NEGATIVE
NITRATE UR QL: NEGATIVE
PH UR STRIP: 7 [PH] (ref 5–7)
RBC #/AREA URNS AUTO: ABNORMAL /HPF
SP GR UR STRIP: 1.02 (ref 1–1.03)
SQUAMOUS #/AREA URNS AUTO: ABNORMAL /LPF
TRICHOMONAS, WET PREP: ABNORMAL
UROBILINOGEN UR STRIP-ACNC: 0.2 E.U./DL
WBC #/AREA URNS AUTO: ABNORMAL /HPF
WBC'S/HIGH POWER FIELD, WET PREP: ABNORMAL
YEAST, WET PREP: ABNORMAL

## 2022-12-07 PROCEDURE — G0145 SCR C/V CYTO,THINLAYER,RESCR: HCPCS | Performed by: ADVANCED PRACTICE MIDWIFE

## 2022-12-07 PROCEDURE — 87624 HPV HI-RISK TYP POOLED RSLT: CPT | Performed by: ADVANCED PRACTICE MIDWIFE

## 2022-12-07 PROCEDURE — 87210 SMEAR WET MOUNT SALINE/INK: CPT | Performed by: ADVANCED PRACTICE MIDWIFE

## 2022-12-07 PROCEDURE — 81001 URINALYSIS AUTO W/SCOPE: CPT | Performed by: ADVANCED PRACTICE MIDWIFE

## 2022-12-07 PROCEDURE — 99203 OFFICE O/P NEW LOW 30 MIN: CPT | Performed by: ADVANCED PRACTICE MIDWIFE

## 2022-12-07 RX ORDER — MICONAZOLE NITRATE 2 %
1 CREAM WITH APPLICATOR VAGINAL AT BEDTIME
Qty: 45 G | Refills: 0 | Status: SHIPPED | OUTPATIENT
Start: 2022-12-07 | End: 2022-12-14

## 2022-12-07 NOTE — PROGRESS NOTES
"S: Pretty is a 31yo , here with complaints of burning with urination for the past 2 days, as well as burning/itching pain in her imani-area for \"a while\". She has tried warm compresses, hydration, and limiting sugar in her diet. None of these have helped her symptoms. She has a history of vaginismus. Is  and not currently sexually active; not using birth control. LMP approx 22. Last PAP was in , she agrees to have that collected today as well.    O:/58   Pulse 60   Temp 98.6  F (37  C)   Wt 70.8 kg (156 lb)   BMI 23.04 kg/m    Exam:  Constitutional: healthy, alert and no distress  Neurologic: Gait normal. Reflexes normal and symmetric. Sensation grossly WNL.  Psychiatric: mentation appears normal and affect normal/bright  Pelvic Exam:  Vulva: excoriation on external skin and surrounding introitus  Vagina: Moist, pink,thick white discharge, well rugated, no lesions  Cervix: Pap smear is taken, parous, smooth, pink, no visible lesions  Wet prep: neg yeast/clue/trich  UA: neg    A/P:  (R30.0) Dysuria  (primary encounter diagnosis)  Plan: UA with Microscopic reflex to Culture - lab         collect, Urine Microscopic    (N89.8) Vaginal irritation  Plan: Wet prep - Clinic Collect    (Z12.4) Screening for malignant neoplasm of cervix  Plan: PAP imaged thin layer screen    (B37.31) Yeast infection of the vagina  Plan: miconazole (MICATIN) 2 % cream    Discussed via mychart that it is reasonable to treat for yeast given her symptoms and the excoriation of her periarea from itching. Recommend 7 day vaginal yeast cream, with some cream applied to surrounding tissues. If no improvement, return to care.    Judson Cárdenas CNM    "

## 2022-12-09 LAB
BKR LAB AP GYN ADEQUACY: NORMAL
BKR LAB AP GYN INTERPRETATION: NORMAL
BKR LAB AP HPV REFLEX: NORMAL
BKR LAB AP PREVIOUS ABNORMAL: NORMAL
PATH REPORT.COMMENTS IMP SPEC: NORMAL
PATH REPORT.COMMENTS IMP SPEC: NORMAL
PATH REPORT.RELEVANT HX SPEC: NORMAL

## 2022-12-13 LAB
HUMAN PAPILLOMA VIRUS 16 DNA: NEGATIVE
HUMAN PAPILLOMA VIRUS 18 DNA: NEGATIVE
HUMAN PAPILLOMA VIRUS FINAL DIAGNOSIS: NORMAL
HUMAN PAPILLOMA VIRUS OTHER HR: NEGATIVE

## 2022-12-14 ENCOUNTER — OFFICE VISIT (OUTPATIENT)
Dept: INTERNAL MEDICINE | Facility: CLINIC | Age: 32
End: 2022-12-14
Payer: COMMERCIAL

## 2022-12-14 VITALS
HEART RATE: 62 BPM | TEMPERATURE: 98 F | RESPIRATION RATE: 13 BRPM | WEIGHT: 153 LBS | BODY MASS INDEX: 22.66 KG/M2 | HEIGHT: 69 IN | OXYGEN SATURATION: 100 %

## 2022-12-14 DIAGNOSIS — E55.9 VITAMIN D DEFICIENCY: ICD-10-CM

## 2022-12-14 DIAGNOSIS — Z20.5 EXPOSURE TO HEPATITIS B: ICD-10-CM

## 2022-12-14 DIAGNOSIS — Z13.220 LIPID SCREENING: ICD-10-CM

## 2022-12-14 DIAGNOSIS — R53.83 FATIGUE, UNSPECIFIED TYPE: ICD-10-CM

## 2022-12-14 DIAGNOSIS — Z00.00 ANNUAL PHYSICAL EXAM: Primary | ICD-10-CM

## 2022-12-14 LAB
ALBUMIN SERPL BCG-MCNC: 4.3 G/DL (ref 3.5–5.2)
ALP SERPL-CCNC: 48 U/L (ref 35–104)
ALT SERPL W P-5'-P-CCNC: 13 U/L (ref 10–35)
ANION GAP SERPL CALCULATED.3IONS-SCNC: 8 MMOL/L (ref 7–15)
AST SERPL W P-5'-P-CCNC: 19 U/L (ref 10–35)
BASOPHILS # BLD AUTO: 0 10E3/UL (ref 0–0.2)
BASOPHILS NFR BLD AUTO: 1 %
BILIRUB SERPL-MCNC: 0.4 MG/DL
BUN SERPL-MCNC: 11.2 MG/DL (ref 6–20)
CALCIUM SERPL-MCNC: 8.7 MG/DL (ref 8.6–10)
CHLORIDE SERPL-SCNC: 104 MMOL/L (ref 98–107)
CHOLEST SERPL-MCNC: 174 MG/DL
CREAT SERPL-MCNC: 0.76 MG/DL (ref 0.51–0.95)
DEPRECATED HCO3 PLAS-SCNC: 29 MMOL/L (ref 22–29)
EOSINOPHIL # BLD AUTO: 0.1 10E3/UL (ref 0–0.7)
EOSINOPHIL NFR BLD AUTO: 3 %
ERYTHROCYTE [DISTWIDTH] IN BLOOD BY AUTOMATED COUNT: 12.6 % (ref 10–15)
GFR SERPL CREATININE-BSD FRML MDRD: >90 ML/MIN/1.73M2
GLUCOSE SERPL-MCNC: 85 MG/DL (ref 70–99)
HCT VFR BLD AUTO: 43.9 % (ref 35–47)
HDLC SERPL-MCNC: 62 MG/DL
HGB BLD-MCNC: 13.9 G/DL (ref 11.7–15.7)
IMM GRANULOCYTES # BLD: 0 10E3/UL
IMM GRANULOCYTES NFR BLD: 0 %
LDLC SERPL CALC-MCNC: 99 MG/DL
LYMPHOCYTES # BLD AUTO: 1.6 10E3/UL (ref 0.8–5.3)
LYMPHOCYTES NFR BLD AUTO: 45 %
MCH RBC QN AUTO: 29.8 PG (ref 26.5–33)
MCHC RBC AUTO-ENTMCNC: 31.7 G/DL (ref 31.5–36.5)
MCV RBC AUTO: 94 FL (ref 78–100)
MONOCYTES # BLD AUTO: 0.3 10E3/UL (ref 0–1.3)
MONOCYTES NFR BLD AUTO: 8 %
NEUTROPHILS # BLD AUTO: 1.5 10E3/UL (ref 1.6–8.3)
NEUTROPHILS NFR BLD AUTO: 42 %
NONHDLC SERPL-MCNC: 112 MG/DL
PLATELET # BLD AUTO: 221 10E3/UL (ref 150–450)
POTASSIUM SERPL-SCNC: 4.4 MMOL/L (ref 3.4–5.3)
PROT SERPL-MCNC: 6.9 G/DL (ref 6.4–8.3)
RBC # BLD AUTO: 4.66 10E6/UL (ref 3.8–5.2)
SODIUM SERPL-SCNC: 141 MMOL/L (ref 136–145)
TRIGL SERPL-MCNC: 65 MG/DL
TSH SERPL DL<=0.005 MIU/L-ACNC: 0.94 UIU/ML (ref 0.3–4.2)
WBC # BLD AUTO: 3.5 10E3/UL (ref 4–11)

## 2022-12-14 PROCEDURE — 36415 COLL VENOUS BLD VENIPUNCTURE: CPT | Performed by: INTERNAL MEDICINE

## 2022-12-14 PROCEDURE — 80050 GENERAL HEALTH PANEL: CPT | Performed by: INTERNAL MEDICINE

## 2022-12-14 PROCEDURE — 99214 OFFICE O/P EST MOD 30 MIN: CPT | Mod: 25 | Performed by: INTERNAL MEDICINE

## 2022-12-14 PROCEDURE — 99395 PREV VISIT EST AGE 18-39: CPT | Performed by: INTERNAL MEDICINE

## 2022-12-14 PROCEDURE — 86706 HEP B SURFACE ANTIBODY: CPT | Performed by: INTERNAL MEDICINE

## 2022-12-14 PROCEDURE — 87340 HEPATITIS B SURFACE AG IA: CPT | Performed by: INTERNAL MEDICINE

## 2022-12-14 PROCEDURE — 80061 LIPID PANEL: CPT | Performed by: INTERNAL MEDICINE

## 2022-12-14 PROCEDURE — 82306 VITAMIN D 25 HYDROXY: CPT | Performed by: INTERNAL MEDICINE

## 2022-12-14 ASSESSMENT — ENCOUNTER SYMPTOMS
HEMATOCHEZIA: 0
PARESTHESIAS: 0
CONSTIPATION: 0
COUGH: 0
JOINT SWELLING: 0
SHORTNESS OF BREATH: 0
SORE THROAT: 0
ABDOMINAL PAIN: 0
CHILLS: 0
ARTHRALGIAS: 0
PALPITATIONS: 0
HEMATURIA: 0
EYE PAIN: 0
NERVOUS/ANXIOUS: 0
BREAST MASS: 0
MYALGIAS: 0
DIARRHEA: 0
DYSURIA: 0
WEAKNESS: 0
FEVER: 0
HEADACHES: 0
FREQUENCY: 0
HEARTBURN: 0
NAUSEA: 0
DIZZINESS: 0

## 2022-12-14 NOTE — PROGRESS NOTES
SUBJECTIVE:   CC: Pretty is an 32 year old who presents for preventive health visit.     Pretty is a 32-year-old female with no past medical history who is currently here for a physical.    She reports that she is always feeling tired and would like to check blood work today.  She is  and a single mom of 2 kids (6 and 8-year-old).  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  There are days when sometimes she does not sleep at all in 24 hours.  On the days when she catches up with her sleep she still feels tired.  Her menstruations are regular but light.  She has noticed that her hair is thinning out and would like to have her thyroid checked.  She does see a gynecologist and up-to-date on her gynecological exam.    Family history significant for Ms. her dad having hepatitis B and passing away from it.  Mom has hypertension and prediabetes.    Healthy Habits:     Getting at least 3 servings of Calcium per day:  NO    Bi-annual eye exam:  NO    Dental care twice a year:  NO    Sleep apnea or symptoms of sleep apnea:  None    Diet:  Regular (no restrictions)    Frequency of exercise:  None    Taking medications regularly:  Yes    Medication side effects:  Not applicable    PHQ-2 Total Score: 0    Additional concerns today:  No    Today's PHQ-2 Score:   PHQ-2 ( 1999 Pfizer) 12/14/2022   Q1: Little interest or pleasure in doing things 0   Q2: Feeling down, depressed or hopeless 0   PHQ-2 Score 0   Q1: Little interest or pleasure in doing things Not at all   Q2: Feeling down, depressed or hopeless Not at all   PHQ-2 Score 0       Have you ever done Advance Care Planning? (For example, a Health Directive, POLST, or a discussion with a medical provider or your loved ones about your wishes): No, advance care planning information given to patient to review.  Patient declined advance care planning discussion at this time.    Social History     Tobacco Use     Smoking status: Never     Smokeless  tobacco: Never   Substance Use Topics     Alcohol use: No       Alcohol Use 12/14/2022   Prescreen: >3 drinks/day or >7 drinks/week? Not Applicable     Reviewed orders with patient.  Reviewed health maintenance and updated orders accordingly -yes      Breast Cancer Screening:    Breast CA Risk Assessment (FHS-7) 12/14/2022   Do you have a family history of breast, colon, or ovarian cancer? No / Unknown     Pertinent mammograms are reviewed under the imaging tab.    History of abnormal Pap smear: No  PAP / HPV Latest Ref Rng & Units 12/7/2022 11/24/2015   PAP   Negative for Intraepithelial Lesion or Malignancy (NILM) Negative for squamous intraepithelial lesion or malignancy  Electronically signed by Mahi Lopez CT (ASCP) on 12/9/2015 at  4:01 PM     HPV16 Negative Negative -   HPV18 Negative Negative -   HRHPV Negative Negative -     Reviewed and updated as needed this visit by clinical staff   Tobacco  Allergies  Meds              Reviewed and updated as needed this visit by Provider                   Review of Systems   Constitutional: Negative for chills and fever.   HENT: Negative for congestion, ear pain, hearing loss and sore throat.    Eyes: Negative for pain and visual disturbance.   Respiratory: Negative for cough and shortness of breath.    Cardiovascular: Negative for chest pain, palpitations and peripheral edema.   Gastrointestinal: Negative for abdominal pain, constipation, diarrhea, heartburn, hematochezia and nausea.   Breasts:  Negative for tenderness, breast mass and discharge.   Genitourinary: Negative for dysuria, frequency, genital sores, hematuria, pelvic pain, urgency, vaginal bleeding and vaginal discharge.   Musculoskeletal: Negative for arthralgias, joint swelling and myalgias.   Skin: Negative for rash.   Neurological: Negative for dizziness, weakness, headaches and paresthesias.   Psychiatric/Behavioral: Negative for mood changes. The patient is not nervous/anxious.        "  OBJECTIVE:   Pulse 62   Temp 98  F (36.7  C) (Tympanic)   Resp 13   Ht 1.753 m (5' 9\")   Wt 69.4 kg (153 lb)   SpO2 100%   BMI 22.59 kg/m    Physical Exam  General: well appearing female, alert and oriented x3  EYES: Eyelids, conjunctiva, and sclera were normal. Pupils were normal.   HEAD, EARS, NOSE, MOUTH, AND THROAT: no cervical LAD, no thyromegaly or nodules appreciated. TMs are visualized and normal, oropharynx is clear.  RESPIRATORY: respirations non labored, CTA bl, no wheezes, rales, no forced expiratory wheezing.  CARDIOVASCULAR: Heart rate and rhythm were normal. No murmurs, rubs,gallops. There was no peripheral edema.   GASTROINTESTINAL: Positive bowel sounds, abdomen is soft, non tender, non distended.     MUSCULOSKELETAL: Muscle mass was normal for age. No joint synovitis or deformity.  LYMPHATIC: There were no enlarged nodes palpable.  SKIN/HAIR/NAILS: Skin color was normal.  No rashes.  NEUROLOGIC: The patient was alert and oriented.  Speech was normal.  There is no facial asymmetry.   PSYCHIATRIC:  Mood and affect were normal.   Breast exam: No axilla lymphadenopathy, breast masses or skin changes appreciated.      ASSESSMENT/PLAN:   Pretty was seen today for physical and recheck medication.    Diagnoses and all orders for this visit:    Annual physical exam  She is up-to-date on Pap smear.  We will check fasting cholesterol today.  She declined flu and COVID vaccinations.    Fatigue, unspecified type  Suspect severe sleep deprivation.  She plans to switch to dayshift at work.  We will check complete labs today to make sure not missing something else.  -     CBC with platelets and differential  -     Comprehensive metabolic panel  -     TSH with free T4 reflex    Vitamin D deficiency  -     Vitamin D Deficiency    Lipid screening  -     Lipid panel reflex to direct LDL Fasting    Exposure to hepatitis B  Dad passed away from hepatitis B.  We will screen her for hepatitis B.  -     Hepatitis B " surface antigen  -     Hepatitis B Surface Antibody          She reports that she has never smoked. She has never used smokeless tobacco.    Danika Garcia MD  [facility]

## 2022-12-15 ENCOUNTER — TELEPHONE (OUTPATIENT)
Dept: INTERNAL MEDICINE | Facility: CLINIC | Age: 32
End: 2022-12-15

## 2022-12-15 DIAGNOSIS — E55.9 VITAMIN D DEFICIENCY: Primary | ICD-10-CM

## 2022-12-15 LAB
DEPRECATED CALCIDIOL+CALCIFEROL SERPL-MC: 16 UG/L (ref 20–75)
HBV SURFACE AB SERPL IA-ACNC: 31.54 M[IU]/ML
HBV SURFACE AB SERPL IA-ACNC: REACTIVE M[IU]/ML
HBV SURFACE AG SERPL QL IA: NONREACTIVE

## 2022-12-15 RX ORDER — ERGOCALCIFEROL 1.25 MG/1
50000 CAPSULE, LIQUID FILLED ORAL WEEKLY
Qty: 12 CAPSULE | Refills: 0 | Status: SHIPPED | OUTPATIENT
Start: 2022-12-15 | End: 2023-04-03

## 2023-01-14 ENCOUNTER — HEALTH MAINTENANCE LETTER (OUTPATIENT)
Age: 33
End: 2023-01-14

## 2023-04-02 DIAGNOSIS — E55.9 VITAMIN D DEFICIENCY: ICD-10-CM

## 2023-04-03 RX ORDER — ERGOCALCIFEROL 1.25 MG/1
CAPSULE, LIQUID FILLED ORAL
Qty: 12 CAPSULE | Refills: 0 | Status: SHIPPED | OUTPATIENT
Start: 2023-04-03 | End: 2023-04-25

## 2023-04-24 ENCOUNTER — NURSE TRIAGE (OUTPATIENT)
Dept: NURSING | Facility: CLINIC | Age: 33
End: 2023-04-24

## 2023-04-24 DIAGNOSIS — Z30.9 CONTRACEPTION MANAGEMENT: Primary | ICD-10-CM

## 2023-04-25 ENCOUNTER — OFFICE VISIT (OUTPATIENT)
Dept: MIDWIFE SERVICES | Facility: CLINIC | Age: 33
End: 2023-04-25
Payer: COMMERCIAL

## 2023-04-25 VITALS
WEIGHT: 155 LBS | HEIGHT: 69 IN | SYSTOLIC BLOOD PRESSURE: 102 MMHG | DIASTOLIC BLOOD PRESSURE: 56 MMHG | BODY MASS INDEX: 22.96 KG/M2

## 2023-04-25 DIAGNOSIS — N76.0 BV (BACTERIAL VAGINOSIS): ICD-10-CM

## 2023-04-25 DIAGNOSIS — B96.89 BV (BACTERIAL VAGINOSIS): ICD-10-CM

## 2023-04-25 DIAGNOSIS — Z01.812 PRE-PROCEDURE LAB EXAM: ICD-10-CM

## 2023-04-25 DIAGNOSIS — Z30.430 ENCOUNTER FOR INSERTION OF INTRAUTERINE CONTRACEPTIVE DEVICE: Primary | ICD-10-CM

## 2023-04-25 DIAGNOSIS — N89.8 VAGINAL IRRITATION: ICD-10-CM

## 2023-04-25 LAB
CLUE CELLS: PRESENT
HCG UR QL: NEGATIVE
TRICHOMONAS, WET PREP: ABNORMAL
WBC'S/HIGH POWER FIELD, WET PREP: ABNORMAL
YEAST, WET PREP: ABNORMAL

## 2023-04-25 PROCEDURE — 58300 INSERT INTRAUTERINE DEVICE: CPT | Performed by: ADVANCED PRACTICE MIDWIFE

## 2023-04-25 PROCEDURE — 87210 SMEAR WET MOUNT SALINE/INK: CPT | Performed by: ADVANCED PRACTICE MIDWIFE

## 2023-04-25 PROCEDURE — 99213 OFFICE O/P EST LOW 20 MIN: CPT | Mod: 25 | Performed by: ADVANCED PRACTICE MIDWIFE

## 2023-04-25 PROCEDURE — 81025 URINE PREGNANCY TEST: CPT | Performed by: ADVANCED PRACTICE MIDWIFE

## 2023-04-25 RX ORDER — COPPER 313.4 MG/1
1 INTRAUTERINE DEVICE INTRAUTERINE ONCE
Status: COMPLETED
Start: 2023-04-25 | End: 2023-04-25

## 2023-04-25 RX ORDER — MULTIVIT-MIN/IRON/FOLIC ACID/K 18-600-40
CAPSULE ORAL
COMMUNITY
End: 2024-04-15

## 2023-04-25 RX ORDER — METRONIDAZOLE 500 MG/1
500 TABLET ORAL 2 TIMES DAILY
Qty: 14 TABLET | Refills: 0 | Status: SHIPPED | OUTPATIENT
Start: 2023-04-25 | End: 2023-05-02

## 2023-04-25 RX ORDER — LEVONORGESTREL 1.5 MG/1
1.5 TABLET ORAL ONCE
Qty: 1 TABLET | Refills: 0 | Status: CANCELLED
Start: 2023-04-25 | End: 2023-04-25

## 2023-04-25 RX ORDER — COPPER 313.4 MG/1
1 INTRAUTERINE DEVICE INTRAUTERINE ONCE
COMMUNITY

## 2023-04-25 RX ADMIN — COPPER 1 EACH: 313.4 INTRAUTERINE DEVICE INTRAUTERINE at 14:55

## 2023-04-25 NOTE — NURSING NOTE
Clinic Administered Medication Documentation      Intrauterine/Implant Insertion Documentation    Device was placed by provider (please see MAR for given by information). Please see MAR and medication order for additional information.     Type: Paragard   Lot # [id number]  NDC: [address]-9  Exp: 07/2028  Remove/Replace in 10 years by: 04/25/2033    Gayathri Erwin CMA on 4/25/2023 at 2:57 PM

## 2023-04-25 NOTE — TELEPHONE ENCOUNTER
See previous note. Pt called requesting Plan B.  Order pended and sent to provider for approval. Pt scheduled for IUD placement.

## 2023-04-25 NOTE — TELEPHONE ENCOUNTER
Patient is wanting to schedule an appointment for emergency contraception appointment.    Patient states on Friday had unprotected sex.  Saturday patient took a plan b  Sunday patient again had unprotected sex  Patient states she doesn't know if she can take another plan B or she read that an IUD can be placed.  Patient feels the copper IUD would be a good choice for her.      Patient is requesting either an appointment or to speak to the midwife regarding this request.    Alva Torres RN   04/24/23 9:00 PM  [facility] Nurse Advisor    Reason for Disposition    [1] Unprotected sexual intercourse AND [2] within past 72 to 120 hours (3 to 5 days)    Additional Information    Negative: Sexual abuse or rape    Negative: Vomiting > 5 times    Negative: Vaginal bleeding after recent emergency contraceptive pills    Protocols used: CONTRACEPTION - EMERGENCY-A-

## 2023-04-25 NOTE — PROGRESS NOTES
"IUD Insertion:    Subjective: Pretty Thomason is a 33 year old  presents for IUD and desires Paragard type IUD. She has used this in the past and liked the way it worked. She  so had the paragard removed and was not sexually active. She is now remarried and would like birth control again. She is using this for emergency contraception today. Discussed risks, benefits, and what to expect afterwards. She would like a wet prep done today. Wondering if she still has a yeast infection. She was tested in 2022. The results were negative but she was given cream to use externally. Discussed testing again today and treatment options we can try. No other questions or concerns at this time.     Patient has been given the opportunity to ask questions about all forms of birth control, including all options appropriate for Pretty Thomason. Discussed that no method of birth control, except abstinence is 100% effective against pregnancy or sexually transmitted infection.     Pretty Thomason understands she may have the IUD removed at any time. IUD should be removed by a health care provider.    The entire insertion procedure was reviewed with the patient, including care after placement.    Is a pregnancy test required: Yes.  Was it positive or negative?  Negative  Was a consent obtained?  Yes      No LMP recorded. Last sexual activity:  and . Used plan B . No allergy to betadine or shellfish. Patient declines STD screening  HCG Qual Urine   Date Value Ref Range Status   2013 NEGATIVE  Final     hCG Urine Qualitative   Date Value Ref Range Status   2023 Negative Negative Final     Comment:     This test is for screening purposes.  Results should be interpreted along with the clinical picture.  Confirmation testing is available if warranted by ordering GLC319, HCG Quantitative Pregnancy.         /56   Ht 1.753 m (5' 9\")   Wt 70.3 kg (155 lb)   BMI 22.89 kg/m      Pelvic " Exam:   EG/BUS: normal genital architecture without lesions, erythema or abnormal secretions.   Vagina: moist, pink, rugae with physiologic discharge and secretions  Cervix: parous no lesions and pink, moist, closed, without lesion or CMT  Uterus: anteverted position, mobile, no pain  Adnexa: within normal limits and no masses, nodularity, tenderness    Wet prep: clue cells     PROCEDURE NOTE: -- IUD Insertion    Reason for Insertion: contraception    Under sterile technique, cervix was visualized with speculum and prepped with Betadine solution swab x 3. Tenaculum was placed for stability. The uterus was gently straightened and sounded to 7.5 cm. IUD prepared for placement, and IUD inserted according to 's instructions without difficulty or significant resitance, and deployed at the fundus. The strings were visualized and trimmed to 3.0 cm from the external os. Tenaculum was removed and hemostasis noted. Speculum removed.  Patient tolerated procedure well.    Lot # [id number]  NDC: [address]-9  Exp: 07/2028    EBL: minimal    Complications: none    ASSESSMENT:     ICD-10-CM    1. Encounter for insertion of intrauterine contraceptive device  Z30.430 paragard intrauterine copper IUD device 1 each     INSERTION INTRAUTERINE DEVICE     HCG Qual, Urine ([id number])     HCG Qual, Urine ([id number])      2. Pre-procedure lab exam  Z01.812 paragard intrauterine copper device           PLAN:  (Z30.430) Encounter for insertion of intrauterine contraceptive device  (primary encounter diagnosis)  Plan: paragard intrauterine copper IUD device 1 each,        INSERTION INTRAUTERINE DEVICE, HCG Qual, Urine         ([id number])    (Z01.812) Pre-procedure lab exam  Plan: paragard intrauterine copper device    (N89.8) Vaginal irritation  Plan: Wet prep - Clinic Collect    (N76.0,  B96.89) BV (bacterial vaginosis)  Plan: metroNIDAZOLE (FLAGYL) 500 MG tablet    Given 's handouts, including when to have IUD removed, list of  danger s/sx, side effects and follow up recommended. Encouraged condom use for prevention of STD. Back up contraception advised for 7 days if progestin method. Advised to call for any fever, for prolonged or severe pain or bleeding, abnormal vaginal discharge, or unable to palpate strings. She was advised to use pain medications (ibuprofen) as needed for mild to moderate pain. Advised to follow-up in clinic in 4-6 weeks for IUD string check if unable to find strings or as directed by provider.     CARLEE PedersenM

## 2023-06-20 NOTE — PROGRESS NOTES
"SUBJECTIVE:  Pretty Thomason is a 33 year old female. She had Paragard placed 4/25/23. Right after placement she had 20 days of bleeding. Now she is having more regular cycles but they are still longer than what she is used to prior to the IUD being in place. Bleeds for about 9-10 days (heavier in the beginning and then lighter near the end). She has a 9 day stay in [place] on 7/1/23 with . Hopeful to not be on her period for this get-away as she would like to be sexually active while not on menses. They recently ! Interested in the pill or other measures to suppress or decrease amount of bleeding for this get-away. LMP: 6/1/23. She desires to continue the Paragard IUD method.  Chief Complaint   Patient presents with     Consult     Period questions related to IUD     Also complains of vulvar itching and has been using topical antifungal cream. No abnormal discharge or odor. Wondering about oral medication to treat the itching. Was treated for BV when IUD was placed.    Active diagnoses this visit:  Surveillance for birth control, intrauterine device  Uterine bleeding  Vulvar itching    Review Of Systems: negative except that which is obtained in the HPI.    Medical, surgical, OB histories, medications and allergies reviewed and updated.    OBJECTIVE:  /60 (BP Location: Right arm, Patient Position: Sitting, Cuff Size: Adult Regular)   Pulse 62   Ht 1.753 m (5' 9\")   Wt 73 kg (161 lb)   LMP 06/01/2023 (Approximate)   BMI 23.78 kg/m      Exam:  Constitutional: healthy, alert and no distress  Cardiovascular: Well perfused.  Respiratory: Breathing unlabored.  : deferred.  Pelvic exam: deferred.  Musculoskeletal: extremities normal, no gross deformities noted, gait normal and normal muscle tone  Skin: no suspicious lesions or rashes  Neurologic: Sensation grossly WNL.  Psychiatric: mentation appears normal and affect normal/bright    ASSESSMENT / PLAN:  (Z30.521) Surveillance for birth " control, intrauterine device  (primary encounter diagnosis)  (N93.9) Uterine bleeding (longer cycles with Copper IUD)  Comment: Discussed option for Ibuprofen course vs oral BC pills. She is very hopeful to suppress her menses for her trip in order to be intimate with her spouse so would prefer oral BC pills. After history review she is a candidate for COCs. Advised to start oral BC pill for one month supply and she could also begin Ibuprofen the day before her trip (600 mg every 8 hours) to suppress bleeding and cramping. Discussed hopefulness for this to suppress her menses for her trip. Advised on warning signs and when to call.  Plan: RXs sent: norethindrone-ethinyl estradiol (JUNEL FE 1/20) 1-20 MG-MCG tablet, ibuprofen (ADVIL/MOTRIN) 600 MG tablet    (L29.2) Vulvar itching  Comment: Advised on wet prep to determine if itching is due to continued BV or yeast. She prefers self-collected swab - instructed on how to obtain. Will send treatment based on results. She would prefer oral Fluconazole if it is yeast.  Plan: Wet prep - Clinic Collect (self collected).      33 minutes on the date of the encounter doing chart review, review of test results, interpretation of tests, patient visit and documentation    CARLEE Reilly, DAINA

## 2023-06-21 ENCOUNTER — OFFICE VISIT (OUTPATIENT)
Dept: MIDWIFE SERVICES | Facility: CLINIC | Age: 33
End: 2023-06-21
Payer: COMMERCIAL

## 2023-06-21 VITALS
HEART RATE: 62 BPM | WEIGHT: 161 LBS | HEIGHT: 69 IN | DIASTOLIC BLOOD PRESSURE: 60 MMHG | BODY MASS INDEX: 23.85 KG/M2 | SYSTOLIC BLOOD PRESSURE: 104 MMHG

## 2023-06-21 DIAGNOSIS — N93.9 UTERINE BLEEDING: ICD-10-CM

## 2023-06-21 DIAGNOSIS — B37.31 YEAST INFECTION OF THE VAGINA: ICD-10-CM

## 2023-06-21 DIAGNOSIS — L29.2 VULVAR ITCHING: ICD-10-CM

## 2023-06-21 DIAGNOSIS — Z30.431 SURVEILLANCE FOR BIRTH CONTROL, INTRAUTERINE DEVICE: Primary | ICD-10-CM

## 2023-06-21 LAB
CLUE CELLS: ABNORMAL
TRICHOMONAS, WET PREP: ABNORMAL
WBC'S/HIGH POWER FIELD, WET PREP: ABNORMAL
YEAST, WET PREP: PRESENT

## 2023-06-21 PROCEDURE — 99214 OFFICE O/P EST MOD 30 MIN: CPT | Performed by: ADVANCED PRACTICE MIDWIFE

## 2023-06-21 PROCEDURE — 87210 SMEAR WET MOUNT SALINE/INK: CPT | Performed by: ADVANCED PRACTICE MIDWIFE

## 2023-06-21 RX ORDER — FLUCONAZOLE 150 MG/1
150 TABLET ORAL ONCE
Qty: 1 TABLET | Refills: 0 | Status: SHIPPED | OUTPATIENT
Start: 2023-06-21 | End: 2023-06-21

## 2023-06-21 RX ORDER — IBUPROFEN 600 MG/1
600 TABLET, FILM COATED ORAL EVERY 8 HOURS PRN
Qty: 30 TABLET | Refills: 0 | Status: SHIPPED | OUTPATIENT
Start: 2023-06-29 | End: 2024-04-15

## 2023-06-21 RX ORDER — NORETHINDRONE ACETATE AND ETHINYL ESTRADIOL 1MG-20(21)
1 KIT ORAL DAILY
Qty: 28 TABLET | Refills: 0 | Status: SHIPPED | OUTPATIENT
Start: 2023-06-21 | End: 2023-07-12

## 2023-06-22 PROBLEM — S92.919A CLOSED FRACTURE OF ONE OR MORE PHALANGES OF FOOT: Status: RESOLVED | Noted: 2022-12-06 | Resolved: 2023-06-22

## 2023-07-26 DIAGNOSIS — N93.9 UTERINE BLEEDING: ICD-10-CM

## 2023-07-26 DIAGNOSIS — Z30.431 SURVEILLANCE FOR BIRTH CONTROL, INTRAUTERINE DEVICE: ICD-10-CM

## 2023-10-24 RX ORDER — NORETHINDRONE ACETATE AND ETHINYL ESTRADIOL AND FERROUS FUMARATE 1MG-20(21)
KIT ORAL
Qty: 84 TABLET | Refills: 1 | OUTPATIENT
Start: 2023-10-24

## 2023-11-14 ENCOUNTER — PATIENT OUTREACH (OUTPATIENT)
Dept: CARE COORDINATION | Facility: CLINIC | Age: 33
End: 2023-11-14
Payer: COMMERCIAL

## 2023-11-28 ENCOUNTER — PATIENT OUTREACH (OUTPATIENT)
Dept: CARE COORDINATION | Facility: CLINIC | Age: 33
End: 2023-11-28
Payer: COMMERCIAL

## 2024-01-24 ENCOUNTER — VIRTUAL VISIT (OUTPATIENT)
Dept: FAMILY MEDICINE | Facility: OTHER | Age: 34
End: 2024-01-24
Payer: COMMERCIAL

## 2024-01-24 DIAGNOSIS — H10.32 ACUTE CONJUNCTIVITIS OF LEFT EYE, UNSPECIFIED ACUTE CONJUNCTIVITIS TYPE: Primary | ICD-10-CM

## 2024-01-24 PROCEDURE — 99213 OFFICE O/P EST LOW 20 MIN: CPT | Mod: 95 | Performed by: PHYSICIAN ASSISTANT

## 2024-01-24 RX ORDER — POLYMYXIN B SULFATE AND TRIMETHOPRIM 1; 10000 MG/ML; [USP'U]/ML
1-2 SOLUTION OPHTHALMIC EVERY 4 HOURS
Qty: 10 ML | Refills: 0 | Status: SHIPPED | OUTPATIENT
Start: 2024-01-24 | End: 2024-01-29

## 2024-01-24 NOTE — PROGRESS NOTES
Pretty is a 33 year old who is being evaluated via a billable video visit.      How would you like to obtain your AVS? MyChart  If the video visit is dropped, the invitation should be resent by: Text to cell phone: [phone]  Will anyone else be joining your video visit? No          Assessment & Plan     (H10.32) Acute conjunctivitis of left eye, unspecified acute conjunctivitis type  (primary encounter diagnosis)  Comment: Symptoms most consistent with viral conjunctivitis but did discuss symptoms which warrant treatment with antibiotic drops including persistent pain, swelling and thicker discharge.  She can use warm and cool compresses and practice good hand hygiene. Discussed symptoms which warrant close follow-up in clinic or with ophthalmology.  Avoid eye make up for now to avoid contamination.   Plan: polymixin b-trimethoprim (POLYTRIM) 93843-1.1         UNIT/ML-% ophthalmic solution    Options for treatment and follow-up care were reviewed with the patient and/or guardian. Patient and/or guardian engaged in the decision making process and verbalized understanding of the options discussed and agreed with the final plan.     Subjective   Pretty is a 33 year old, presenting for the following health issues:  possible pink eye  (Left eye /)      1/24/2024    12:57 PM   Additional Questions   Roomed by Alejandra ELI   Accompanied by Self     History of Present Illness       Reason for visit:  Pink eye  Symptom onset:  1-3 days ago  Symptoms include:  Pain, green discharge  Symptom intensity:  Moderate  Symptom progression:  Worsening  Had these symptoms before:  No  What makes it worse:  NA  What makes it better:  NA    She eats 0-1 servings of fruits and vegetables daily.She consumes 0 sweetened beverage(s) daily.She exercises with enough effort to increase her heart rate 9 or less minutes per day.  She exercises with enough effort to increase her heart rate 3 or less days per week.   She is taking medications  regularly.     - Last night had some discomfort in the left eye and then this morning she had a lot of discomfort 6-7/10 and it was swollen, eye was swollen shut and green/yellow discharge. She was able to wash it off.  But it is still painful and blurry and still having discharge throughout the day.       Review of Systems  Constitutional, HEENT, cardiovascular, pulmonary, gi and gu systems are negative, except as otherwise noted.      Objective           Vitals:  No vitals were obtained today due to virtual visit.    Physical Exam   GENERAL: alert and no distress  EYES: Left eye upper eyelid mildly swollen. No obvious discharge. Conjunctiva and sclera clear.   RESP: No audible wheeze, cough, or visible cyanosis.    SKIN: Visible skin clear. No significant rash, abnormal pigmentation or lesions.  NEURO: Cranial nerves grossly intact.  Mentation and speech appropriate for age.  PSYCH: Appropriate affect, tone, and pace of words          Video-Visit Details    Type of service:  Video Visit   Video Start Time: 1:50 PM  Video End Time:1:54 PM    Originating Location (pt. Location): Home    Distant Location (provider location):  Off-site  Platform used for Video Visit: Eileen  Signed Electronically by: Sunshine Garcia PA-C

## 2024-02-11 ENCOUNTER — HEALTH MAINTENANCE LETTER (OUTPATIENT)
Age: 34
End: 2024-02-11
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
## 2024-04-15 ENCOUNTER — OFFICE VISIT (OUTPATIENT)
Dept: MIDWIFE SERVICES | Facility: CLINIC | Age: 34
End: 2024-04-15
Payer: COMMERCIAL

## 2024-04-15 VITALS
HEIGHT: 69 IN | DIASTOLIC BLOOD PRESSURE: 72 MMHG | WEIGHT: 175 LBS | BODY MASS INDEX: 25.92 KG/M2 | SYSTOLIC BLOOD PRESSURE: 90 MMHG

## 2024-04-15 DIAGNOSIS — Z97.5 IUD (INTRAUTERINE DEVICE) IN PLACE: ICD-10-CM

## 2024-04-15 DIAGNOSIS — N94.2 VAGINISMUS: Primary | ICD-10-CM

## 2024-04-15 DIAGNOSIS — N93.9 ABNORMAL UTERINE BLEEDING (AUB): ICD-10-CM

## 2024-04-15 PROCEDURE — 99214 OFFICE O/P EST MOD 30 MIN: CPT | Performed by: ADVANCED PRACTICE MIDWIFE

## 2024-04-15 ASSESSMENT — ANXIETY QUESTIONNAIRES
GAD7 TOTAL SCORE: 0
7. FEELING AFRAID AS IF SOMETHING AWFUL MIGHT HAPPEN: NOT AT ALL
3. WORRYING TOO MUCH ABOUT DIFFERENT THINGS: NOT AT ALL
1. FEELING NERVOUS, ANXIOUS, OR ON EDGE: NOT AT ALL
IF YOU CHECKED OFF ANY PROBLEMS ON THIS QUESTIONNAIRE, HOW DIFFICULT HAVE THESE PROBLEMS MADE IT FOR YOU TO DO YOUR WORK, TAKE CARE OF THINGS AT HOME, OR GET ALONG WITH OTHER PEOPLE: NOT DIFFICULT AT ALL
5. BEING SO RESTLESS THAT IT IS HARD TO SIT STILL: NOT AT ALL
GAD7 TOTAL SCORE: 0
6. BECOMING EASILY ANNOYED OR IRRITABLE: NOT AT ALL
2. NOT BEING ABLE TO STOP OR CONTROL WORRYING: NOT AT ALL

## 2024-04-15 ASSESSMENT — PATIENT HEALTH QUESTIONNAIRE - PHQ9
5. POOR APPETITE OR OVEREATING: NOT AT ALL
SUM OF ALL RESPONSES TO PHQ QUESTIONS 1-9: 0

## 2024-09-11 ENCOUNTER — VIRTUAL VISIT (OUTPATIENT)
Dept: FAMILY MEDICINE | Facility: CLINIC | Age: 34
End: 2024-09-11
Payer: COMMERCIAL

## 2024-09-11 DIAGNOSIS — E55.9 VITAMIN D DEFICIENCY: Primary | ICD-10-CM

## 2024-09-11 DIAGNOSIS — Z11.1 SCREENING FOR TUBERCULOSIS: ICD-10-CM

## 2024-09-11 PROCEDURE — 99214 OFFICE O/P EST MOD 30 MIN: CPT | Mod: 95 | Performed by: FAMILY MEDICINE

## 2024-09-11 NOTE — PROGRESS NOTES
"Pretty is a 34 year old who is being evaluated via a billable video visit.    How would you like to obtain your AVS? Mail a copy  If the video visit is dropped, the invitation should be resent by: Text to cell phone: [phone]  Will anyone else be joining your video visit? No      Assessment & Plan     Vitamin D deficiency  Recheck vitamin D and restart replacement. Once done, continue with 2000 international unit(s) daily.  - 25 OH Vit D therapy monitoring; Future  - vitamin D3 (CHOLECALCIFEROL) 1.25 MG (97302 UT) capsule; Take 1 capsule (50,000 Units) by mouth every 7 days.    Screening for tuberculosis  - Quantiferon TB Gold Plus; Future          BMI  Estimated body mass index is 25.84 kg/m  as calculated from the following:    Height as of 4/15/24: 1.753 m (5' 9\").    Weight as of 4/15/24: 79.4 kg (175 lb).       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  History of BCG vaccine in childhood. Needs TB gold test. She is feeling well. No fevers, chills, night sweats. No cough, shortness of breath, or dyspnea.    Vitamin D Deficiency: Vitamin D checked in 2022 and was low -- 19. Started on 12 weeks of high-dose vitamin D therapy. Has not been taking since then.     Review of Systems  Constitutional, HEENT, cardiovascular, pulmonary, gi and gu systems are negative, except as otherwise noted.      Objective           Vitals:  No vitals were obtained today due to virtual visit.    Physical Exam   GENERAL: alert and no distress  EYES: Eyes grossly normal to inspection.  No discharge or erythema, or obvious scleral/conjunctival abnormalities.  RESP: No audible wheeze, cough, or visible cyanosis.    SKIN: Visible skin clear. No significant rash, abnormal pigmentation or lesions.  NEURO: Cranial nerves " grossly intact.  Mentation and speech appropriate for age.  PSYCH: Appropriate affect, tone, and pace of words          Video-Visit Details    Type of service:  Video Visit   Originating Location (pt. Location): Home    Distant Location (provider location):  On-site  Platform used for Video Visit: Eileen  Signed Electronically by: Jose Hoang DO

## 2024-09-14 ENCOUNTER — LAB (OUTPATIENT)
Dept: LAB | Facility: CLINIC | Age: 34
End: 2024-09-14
Payer: COMMERCIAL

## 2024-09-14 DIAGNOSIS — E55.9 VITAMIN D DEFICIENCY: ICD-10-CM

## 2024-09-14 DIAGNOSIS — Z11.1 SCREENING FOR TUBERCULOSIS: ICD-10-CM

## 2024-09-14 PROCEDURE — 86481 TB AG RESPONSE T-CELL SUSP: CPT

## 2024-09-14 PROCEDURE — 82306 VITAMIN D 25 HYDROXY: CPT

## 2024-09-14 PROCEDURE — 36415 COLL VENOUS BLD VENIPUNCTURE: CPT

## 2024-09-16 LAB
GAMMA INTERFERON BACKGROUND BLD IA-ACNC: 0.02 IU/ML
M TB IFN-G BLD-IMP: NEGATIVE
M TB IFN-G CD4+ BCKGRND COR BLD-ACNC: 9.98 IU/ML
MITOGEN IGNF BCKGRD COR BLD-ACNC: 0 IU/ML
MITOGEN IGNF BCKGRD COR BLD-ACNC: 0 IU/ML
QUANTIFERON MITOGEN: 10 IU/ML
QUANTIFERON NIL TUBE: 0.02 IU/ML
QUANTIFERON TB1 TUBE: 0.02 IU/ML
QUANTIFERON TB2 TUBE: 0.02

## 2024-09-17 LAB
DEPRECATED CALCIDIOL+CALCIFEROL SERPL-MC: <30 UG/L (ref 20–75)
VITAMIN D2 SERPL-MCNC: <5 UG/L
VITAMIN D3 SERPL-MCNC: 25 UG/L

## 2024-10-03 ENCOUNTER — OFFICE VISIT (OUTPATIENT)
Dept: INTERNAL MEDICINE | Facility: CLINIC | Age: 34
End: 2024-10-03
Payer: COMMERCIAL

## 2024-10-03 ENCOUNTER — PATIENT OUTREACH (OUTPATIENT)
Dept: CARE COORDINATION | Facility: CLINIC | Age: 34
End: 2024-10-03

## 2024-10-03 VITALS
TEMPERATURE: 97.8 F | RESPIRATION RATE: 18 BRPM | WEIGHT: 190 LBS | BODY MASS INDEX: 28.14 KG/M2 | SYSTOLIC BLOOD PRESSURE: 108 MMHG | HEIGHT: 69 IN | OXYGEN SATURATION: 98 % | DIASTOLIC BLOOD PRESSURE: 71 MMHG | HEART RATE: 97 BPM

## 2024-10-03 DIAGNOSIS — Z59.9 NEED FOR FINANCIAL SUPPORT: ICD-10-CM

## 2024-10-03 DIAGNOSIS — F32.A ANXIETY AND DEPRESSION: Primary | ICD-10-CM

## 2024-10-03 DIAGNOSIS — F41.9 ANXIETY AND DEPRESSION: Primary | ICD-10-CM

## 2024-10-03 PROCEDURE — 99215 OFFICE O/P EST HI 40 MIN: CPT

## 2024-10-03 RX ORDER — HYDROXYZINE HYDROCHLORIDE 25 MG/1
TABLET, FILM COATED ORAL
Qty: 60 TABLET | Refills: 1 | Status: SHIPPED | OUTPATIENT
Start: 2024-10-03 | End: 2024-10-17

## 2024-10-03 RX ORDER — ESCITALOPRAM OXALATE 5 MG/1
5 TABLET ORAL DAILY
Qty: 30 TABLET | Refills: 0 | Status: SHIPPED | OUTPATIENT
Start: 2024-10-03 | End: 2024-10-17

## 2024-10-03 SDOH — ECONOMIC STABILITY - INCOME SECURITY: PROBLEM RELATED TO HOUSING AND ECONOMIC CIRCUMSTANCES, UNSPECIFIED: Z59.9

## 2024-10-03 ASSESSMENT — ANXIETY QUESTIONNAIRES
IF YOU CHECKED OFF ANY PROBLEMS ON THIS QUESTIONNAIRE, HOW DIFFICULT HAVE THESE PROBLEMS MADE IT FOR YOU TO DO YOUR WORK, TAKE CARE OF THINGS AT HOME, OR GET ALONG WITH OTHER PEOPLE: VERY DIFFICULT
6. BECOMING EASILY ANNOYED OR IRRITABLE: NOT AT ALL
GAD7 TOTAL SCORE: 16
3. WORRYING TOO MUCH ABOUT DIFFERENT THINGS: NEARLY EVERY DAY
7. FEELING AFRAID AS IF SOMETHING AWFUL MIGHT HAPPEN: NEARLY EVERY DAY
6. BECOMING EASILY ANNOYED OR IRRITABLE: NOT AT ALL
GAD7 TOTAL SCORE: 16
3. WORRYING TOO MUCH ABOUT DIFFERENT THINGS: NEARLY EVERY DAY
5. BEING SO RESTLESS THAT IT IS HARD TO SIT STILL: MORE THAN HALF THE DAYS
8. IF YOU CHECKED OFF ANY PROBLEMS, HOW DIFFICULT HAVE THESE MADE IT FOR YOU TO DO YOUR WORK, TAKE CARE OF THINGS AT HOME, OR GET ALONG WITH OTHER PEOPLE?: VERY DIFFICULT
GAD7 TOTAL SCORE: 16
2. NOT BEING ABLE TO STOP OR CONTROL WORRYING: NEARLY EVERY DAY
1. FEELING NERVOUS, ANXIOUS, OR ON EDGE: NEARLY EVERY DAY
4. TROUBLE RELAXING: MORE THAN HALF THE DAYS
2. NOT BEING ABLE TO STOP OR CONTROL WORRYING: NEARLY EVERY DAY
1. FEELING NERVOUS, ANXIOUS, OR ON EDGE: NEARLY EVERY DAY
GAD7 TOTAL SCORE: 16
5. BEING SO RESTLESS THAT IT IS HARD TO SIT STILL: MORE THAN HALF THE DAYS
IF YOU CHECKED OFF ANY PROBLEMS ON THIS QUESTIONNAIRE, HOW DIFFICULT HAVE THESE PROBLEMS MADE IT FOR YOU TO DO YOUR WORK, TAKE CARE OF THINGS AT HOME, OR GET ALONG WITH OTHER PEOPLE: VERY DIFFICULT
7. FEELING AFRAID AS IF SOMETHING AWFUL MIGHT HAPPEN: NEARLY EVERY DAY
7. FEELING AFRAID AS IF SOMETHING AWFUL MIGHT HAPPEN: NEARLY EVERY DAY

## 2024-10-03 ASSESSMENT — COLUMBIA-SUICIDE SEVERITY RATING SCALE - C-SSRS
1. WITHIN THE PAST MONTH, HAVE YOU WISHED YOU WERE DEAD OR WISHED YOU COULD GO TO SLEEP AND NOT WAKE UP?: YES
6. HAVE YOU EVER DONE ANYTHING, STARTED TO DO ANYTHING, OR PREPARED TO DO ANYTHING TO END YOUR LIFE?: NO
5. IN THE PAST MONTH, HAVE YOU STARTED TO WORK OUT OR WORKED OUT THE DETAILS OF HOW TO KILL YOURSELF? DO YOU INTEND TO CARRY OUT THIS PLAN?: NO
4. IN THE PAST MONTH, HAVE YOU HAD THESE THOUGHTS AND HAD SOME INTENTION OF ACTING ON THEM?: NO
2. IN THE PAST MONTH, HAVE YOU ACTUALLY HAD ANY THOUGHTS OF KILLING YOURSELF?: YES
3. IN THE PAST MONTH, HAVE YOU BEEN THINKING ABOUT HOW YOU MIGHT KILL YOURSELF?: NO

## 2024-10-03 ASSESSMENT — ENCOUNTER SYMPTOMS: NERVOUS/ANXIOUS: 1

## 2024-10-03 ASSESSMENT — PATIENT HEALTH QUESTIONNAIRE - PHQ9
5. POOR APPETITE OR OVEREATING: MORE THAN HALF THE DAYS
SUM OF ALL RESPONSES TO PHQ QUESTIONS 1-9: 22

## 2024-10-03 NOTE — LETTER
[facility] CARE COORDINATION  [address]  [place] [address]     October 3, 2024    Pretty Thomason  [address]  [place] [address]      Dear Pretty,    I am a clinic care coordinator who works with CARLEE Quiñones CNP with the [facility]. I wanted to introduce myself and provide you with my contact information for you to be able to call me with any questions or concerns. Below is a description of clinic care coordination and how I can further assist you.       The clinic care coordination team is made up of a registered nurse, , financial resource worker and community health worker who understand the health care system. The goal of clinic care coordination is to help you manage your health and improve access to the health care system. Our team works alongside your provider to assist you in determining your health and social needs. We can help you obtain health care and community resources, providing you with necessary information and education. We can work with you through any barriers and develop a care plan that helps coordinate and strengthen the communication between you and your care team.  Our services are voluntary and are offered without charge to you personally.    Please feel free to contact me with any questions or concerns regarding care coordination and what we can offer.      We are focused on providing you with the highest-quality healthcare experience possible.    Sincerely,     Dolly Lovett, ANNIKA, MSW Clinic   [facility]  Care Coordination  [facility]   [email or web address]  Office: [phone]  Employed by [facility]
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
## 2024-10-03 NOTE — PROGRESS NOTES
Clinic Care Coordination Contact  [facility] [place]/Voicemail    Clinical Data: Care Coordinator Outreach    Outreach Documentation Number of Outreach Attempt   10/3/2024   1:54 PM 1       Left message on patient's voicemail with call back information and requested return call.    Plan: Care Coordinator sent care coordination introduction letter via [facility]. Care Coordinator will try to reach patient again in 1-2 business days.    ANNIKA Waddell, MSW   [facility]  Care Coordination  [facility]  [phone]  10/3/2024 1:55 PM

## 2024-10-07 NOTE — PROGRESS NOTES
Clinic Care Coordination Contact  [facility]/Voicemail    Clinical Data: Care Coordinator Outreach    Outreach Documentation Number of Outreach Attempt   10/3/2024   1:54 PM 1   10/7/2024   2:48 PM 2       Left message on patient's voicemail with call back information and requested return call.    Plan: Care Coordinator will do no further outreaches at this time.    ANNIKA Waddell, MSW   [facility]  Care Coordination  [facility]  [phone]  10/7/2024 2:48 PM

## 2024-10-16 ENCOUNTER — VIRTUAL VISIT (OUTPATIENT)
Dept: PSYCHOLOGY | Facility: CLINIC | Age: 34
End: 2024-10-16
Payer: COMMERCIAL

## 2024-10-16 DIAGNOSIS — F41.1 GENERALIZED ANXIETY DISORDER: ICD-10-CM

## 2024-10-16 DIAGNOSIS — F33.2 SEVERE EPISODE OF RECURRENT MAJOR DEPRESSIVE DISORDER, WITHOUT PSYCHOTIC FEATURES (H): Primary | ICD-10-CM

## 2024-10-16 PROCEDURE — 90791 PSYCH DIAGNOSTIC EVALUATION: CPT | Mod: 95

## 2024-10-16 ASSESSMENT — COLUMBIA-SUICIDE SEVERITY RATING SCALE - C-SSRS
1. IN THE PAST MONTH, HAVE YOU WISHED YOU WERE DEAD OR WISHED YOU COULD GO TO SLEEP AND NOT WAKE UP?: YES
6. HAVE YOU EVER DONE ANYTHING, STARTED TO DO ANYTHING, OR PREPARED TO DO ANYTHING TO END YOUR LIFE?: NO
TOTAL  NUMBER OF ABORTED OR SELF INTERRUPTED ATTEMPTS LIFETIME: NO
1. HAVE YOU WISHED YOU WERE DEAD OR WISHED YOU COULD GO TO SLEEP AND NOT WAKE UP?: YES
REASONS FOR IDEATION PAST MONTH: COMPLETELY TO END OR STOP THE PAIN (YOU COULDN'T GO ON LIVING WITH THE PAIN OR HOW YOU WERE FEELING)
ATTEMPT LIFETIME: NO
TOTAL  NUMBER OF INTERRUPTED ATTEMPTS LIFETIME: NO
REASONS FOR IDEATION LIFETIME: COMPLETELY TO END OR STOP THE PAIN (YOU COULDN'T GO ON LIVING WITH THE PAIN OR HOW YOU WERE FEELING)
2. HAVE YOU ACTUALLY HAD ANY THOUGHTS OF KILLING YOURSELF?: NO

## 2024-10-16 ASSESSMENT — PATIENT HEALTH QUESTIONNAIRE - PHQ9
10. IF YOU CHECKED OFF ANY PROBLEMS, HOW DIFFICULT HAVE THESE PROBLEMS MADE IT FOR YOU TO DO YOUR WORK, TAKE CARE OF THINGS AT HOME, OR GET ALONG WITH OTHER PEOPLE: VERY DIFFICULT
SUM OF ALL RESPONSES TO PHQ QUESTIONS 1-9: 19
SUM OF ALL RESPONSES TO PHQ QUESTIONS 1-9: 19

## 2024-10-16 NOTE — PROGRESS NOTES
"    [facility]         PATIENT'S NAME: Pretty Thomason  PREFERRED NAME: Pretty  PRONOUNS:     she/her  MRN: [id number]  : 1990  ADDRESS: [address]  [facility] [address]  ACCT. NUMBER:  [id number]  DATE OF SERVICE: 10/16/24  START TIME: 10:00am  END TIME: 10:55am  PREFERRED PHONE: [phone]  May we leave a program related message: Yes  EMERGENCY CONTACT: was obtained  .  SERVICE MODALITY:  Video Visit:      Provider verified identity through the following two step process.  Patient provided:  Patient  and Patient address    Telemedicine Visit: The patient's condition can be safely assessed and treated via synchronous audio and visual telemedicine encounter.      Reason for Telemedicine Visit: Patient has requested telehealth visit and Patient convenience (e.g. access to timely appointments / distance to available provider)    Originating Site (Patient Location): Patient's home    Distant Site (Provider Location): Provider Remote Setting- Home Office    Consent:  The patient/guardian has verbally consented to: the potential risks and benefits of telemedicine (video visit) versus in person care; bill my insurance or make self-payment for services provided; and responsibility for payment of non-covered services.     Patient would like the video invitation sent by:  My Chart    Mode of Communication:  Video Conference via Amwell    Distant Location (Provider):  On-site    As the provider I attest to compliance with applicable laws and regulations related to telemedicine.    UNIVERSAL ADULT Mental Health DIAGNOSTIC ASSESSMENT    Identifying Information:  Patient is a 34 year old,  [place] individual.  Patient was referred for an assessment by referring provider.  Patient attended the session alone.    Chief Complaint:   The reason for seeking services at this time is: \"Social isolation and anxiety symptoms\".  The problem(s) began 24.    Patient has not attempted to " "resolve these concerns in the past.    Social/Family History:  Patient reported they grew up in [place]  since [place] and resided in [place], [place], and [facility] previous to the move to [place].  They were raised by biological mother  .  Parents .  Patient reported that their childhood was \"chaotic in the best way\" she was raised with 6 other siblings and \"there was always something going on\".  Patient described their current relationships with family of origin as \"good\" although she cites cultural barriers for mental health causing significant emotional isolation from her family at this time.     The patient describes their cultural background as Somalian.  Cultural influences and impact on patient's life structure, values, norms, and healthcare: Samaritan-rocael ;German-ethnic.  Contextual influences on patient's health include: Contextual Factors: Individual Factors related to how she has perceived health in her life time , Family Factors related to how she was taught about health in her family, Learning Environment Factors related to her education for her DNP and how she views health now as a professional, and Community Factors related to the communities she was raised in and how they viewed wellbeing .    These factors will be addressed in the Preliminary Treatment plan. Patient identified their preferred language to be English. Patient reported they does not need the assistance of an  or other support involved in therapy.     Patient reported had no significant delays in developmental tasks.   Patient's highest education level was graduate school  .  Patient identified the following learning problems: none reported.  Modifications will not be used to assist communication in therapy. Patient reports they are not  able to understand written materials.    Patient reported the following relationship history as \"being  and remarried\".  Patient's current relationship status is  for 1 " "year.   Patient identified their sexual orientation as heterosexual.  Patient reported having 2 child(apryl). Patient identified partner as part of their support system.  Patient identified the quality of these relationships as \"difficult to be vulnerable in\".    Patient's current living/housing situation involves staying in own home/apartment.  The immediate members of family and household include Sam Chung, 10, and son who is 8 years old,  and they report that housing is stable.    Patient is currently employed fulltime.  Patient reports their finances are obtained through employment. Patient does identify finances as a current stressor.      Patient reported that they have been involved with the legal system.  Custody concerns with upcoming court in December of 2024. Patient does not report being under probation/ parole/ jurisdiction. They are not under any current court jurisdiction. .    Patient's Strengths and Limitations:  Patient identified the following strengths or resources that will help them succeed in treatment: Religion / Sabianist, commitment to health and well being, rocael / spirituality, insight, intelligence, motivation, and work ethic. Things that may interfere with the patient's success in treatment include: few friends, financial hardship, lack of family support, and lack of social support.     Assessments:  The following assessments were completed by patient for this visit:  PHQ9:       4/15/2024     8:53 AM 10/3/2024     8:35 AM 10/16/2024     9:37 AM   PHQ-9 SCORE   PHQ-9 Total Score MyChart   19 (Moderately severe depression)   PHQ-9 Total Score 0 22 19     GAD7:       4/15/2024     8:53 AM 10/3/2024     8:21 AM 10/3/2024     8:35 AM   MORGAN-7 SCORE   Total Score  16 (severe anxiety)    Total Score 0 16 16     CAGE-AID:       10/16/2024     9:53 AM   CAGE-AID Total Score   Total Score 0   Total Score MyChart 0 (A total score of 2 or greater is considered clinically significant)     PROMIS " 10-Global Health (all questions and answers displayed):       10/16/2024     9:52 AM   PROMIS 10   In general, would you say your health is: Good   In general, would you say your quality of life is: Poor   In general, how would you rate your physical health? Good   In general, how would you rate your mental health, including your mood and your ability to think? Poor   In general, how would you rate your satisfaction with your social activities and relationships? Poor   In general, please rate how well you carry out your usual social activities and roles Good   To what extent are you able to carry out your everyday physical activities such as walking, climbing stairs, carrying groceries, or moving a chair? Mostly   In the past 7 days, how often have you been bothered by emotional problems such as feeling anxious, depressed, or irritable? Often   In the past 7 days, how would you rate your fatigue on average? Severe   In the past 7 days, how would you rate your pain on average, where 0 means no pain, and 10 means worst imaginable pain? 1   In general, would you say your health is: 3   In general, would you say your quality of life is: 1   In general, how would you rate your physical health? 3   In general, how would you rate your mental health, including your mood and your ability to think? 1   In general, how would you rate your satisfaction with your social activities and relationships? 1   In general, please rate how well you carry out your usual social activities and roles. (This includes activities at home, at work and in your community, and responsibilities as a parent, child, spouse, employee, friend, etc.) 3   To what extent are you able to carry out your everyday physical activities such as walking, climbing stairs, carrying groceries, or moving a chair? 4   In the past 7 days, how often have you been bothered by emotional problems such as feeling anxious, depressed, or irritable? 4   In the past 7 days, how  "would you rate your fatigue on average? 4   In the past 7 days, how would you rate your pain on average, where 0 means no pain, and 10 means worst imaginable pain? 1   Global Mental Health Score 5   Global Physical Health Score 13   PROMIS TOTAL - SUBSCORES 18     [place] Suicide Severity Rating Scale (Lifetime/Recent)      10/16/2024    10:10 AM   [place] Suicide Severity Rating (Lifetime/Recent)   Q1 Wish to be Dead (Lifetime) Y   Wish to be Dead Description (Lifetime) \"the last 10 years, when I was  to my ex, when I was trying to get , when I did get  and now. Everytime I got an eviction notice in grad school. My kids and my rocael are the only two reasons why I am here, and now my partner too.\"   1. Wish to be Dead (Past 1 Month) Y   Wish to be Dead Description (Past 1 Month) Its been consistent for the last year. It has been almost everyday for the last few months.   Q2 Non-Specific Active Suicidal Thoughts (Lifetime) N   Most Severe Ideation Rating (Lifetime) 1   Most Severe Ideation Rating (Past 1 Month) 1   Frequency (Lifetime) 1   Frequency (Past 1 Month) 4   Duration (Lifetime) 2   Duration (Past 1 Month) 2   Controllability (Lifetime) 1   Controllability (Past 1 Month) 1   Deterrents (Lifetime) 1   Deterrents (Past 1 Month) 1   Reasons for Ideation (Lifetime) 5   Reasons for Ideation (Past 1 Month) 5   Actual Attempt (Lifetime) N   Has subject engaged in non-suicidal self-injurious behavior? (Lifetime) N   Interrupted Attempts (Lifetime) N   Aborted or Self-Interrupted Attempt (Lifetime) N   Preparatory Acts or Behavior (Lifetime) N   Calculated C-SSRS Risk Score (Lifetime/Recent) Low Risk       Personal and Family Medical History:  Patient does not report a family history of mental health concerns.  Patient reports family history includes Asthma in her mother; Diabetes in her father; Hepatitis in her father; Hyperlipidemia in her mother; Hypertension in her maternal grandmother " "and mother; No Known Problems in her brother, brother, brother, brother, brother, sister, sister, and sister..     Patient does report Mental Health Diagnosis and/or Treatment.  Patient reported the following previous diagnoses which include(s): depression .  Patient reported symptoms began \"a couple years ago\" when she was navigating her divorce and began single parenting.  Patient has received mental health services in the past:  Psychiatry services through her PCP  Psychiatric Hospitalizations: none.  Patient denies a history of civil commitment.      Currently, patient  is  not receiving other mental health services.    Patient has had a physical exam to rule out medical causes for current symptoms.  Date of last physical exam was within the past year. Client was encouraged to follow up with PCP if symptoms were to develop. The patient has a [facility] Provider, who is named Kathi Irizarry..  Patient reports no current medical and/or dental concerns.  Patient denies any issues with pain..   There are not significant appetite / nutritional concerns / weight changes.   Patient does not report a history of head injury / trauma / cognitive impairment.     See EPIC for most updated medication list.    Medication Adherence:  Patient reports not taking.    Patient Allergies:  No Known Allergies    Medical History:    Past Medical History:   Diagnosis Date    Closed fracture of one or more phalanges of foot 12/6/2022    Formatting of this note might be different from the original. Created by Conversion    IUD (intrauterine device) in place 04/25/2023    [facility]         Current Mental Status Exam:   Appearance:  Appropriate    Eye Contact:  Good   Psychomotor:  Normal       Gait / station:  no problem  Attitude / Demeanor: Cooperative  Interested Pleasant  Speech      Rate / Production: Normal/ Responsive      Volume:  Normal  volume      Language:  intact, no problems, and good  Mood:   Anxious  " Depressed  Sad   Affect:   Appropriate  Tearful   Thought Content: Clear   Thought Process: Coherent  Logical       Associations: No loosening of associations  Insight:   Good   Judgment:  Intact   Orientation:  All  Attention/concentration: Good    Substance Use:   Patient did not report a family history of substance use concerns; see medical history section for details.  Patient has not received chemical dependency treatment in the past.  Patient has not ever been to detox.      Patient is not currently receiving any chemical dependency treatment. Patient reported the following problems as a result of their substance use:   Pt denies all substance use other than caffeine in the morning when she drinks coffee .    Patient Patient denies any history of substance use.     Substance Use: No symptoms    Based on the CAGE and clinical interview there  are not indications of drug or alcohol abuse.    Significant Losses / Trauma / Abuse / Neglect Issues:   Patient did not  serve in the .  There are indications or report of significant loss, trauma, abuse or neglect issues related to: are indications or report of significant loss, trauma, abuse or neglect issues related to, death of family members, divorce / relational changes  , and abuse experiences previous in life that patient asked to not talk about during the first appointment with provider .  Concerns for possible neglect are not present.    Safety Assessment:   Patient denies current homicidal ideation and behaviors.  Patient denies current self-injurious ideation and behaviors.    Patient denied risk behaviors associated with substance use.   Patient denies any high risk behaviors associated with mental health symptoms.  Patient reports the following current concerns for their personal safety: None.  Patient reports there are not firearms in the house.       There are no firearms in the home..    History of Safety Concerns:  Patient denied a history of  homicidal ideation.     Patient denied a history of personal safety concerns.    Patient denied a history of assaultive behaviors.    Patient denied a history of sexual assault behaviors.     Patient denied a history of risk behaviors associated with substance use.  Patient denies any history of high risk behaviors associated with mental health symptoms.  Patient reports the following protective factors: dedication to family or friends; other    Risk Plan:  See Recommendations for Safety and Risk Management Plan    Review of Symptoms per patient report:   Depression: Lack of interest or pleasure in doing things, Feeling sad, down, or depressed, Feelings of hopelessness, Change in energy level, Change in sleep, Change in appetite, Low self-worth, Difficulties concentrating, Psychomotor slowing or agitation, Suicidal ideation, Excessive or inappropriate guilt, Feelings of helplessness, Withdrawn, and Frequent crying  Carol:  No Symptoms  Psychosis: No Symptoms  Anxiety: Excessive worry, Nervousness, Physical complaints, such as headaches, stomachaches, muscle tension, Social anxiety, Sleep disturbance, Psychomotor agitation, and Ruminations  Panic:  No symptoms  Post Traumatic Stress Disorder:   Pt shared she has had experienced traumatic events previous in life but did not feel comfortable sharing during first appointment.    Eating Disorder: No Symptoms  ADD / ADHD:  No symptoms  Conduct Disorder: No symptoms  Autism Spectrum Disorder: No symptoms  Obsessive Compulsive Disorder: No Symptoms    Patient reports the following compulsive behaviors and treatment history:  None reported at the time of the assessment .      Diagnostic Criteria:   Generalized Anxiety Disorder  A. Excessive anxiety and worry about a number of events or activities (such as work or school performance).   B. The person finds it difficult to control the worry.  C. Select 3 or more symptoms (required for diagnosis). Only one item is required in  children.   - Restlessness or feeling keyed up or on edge.    - Being easily fatigued.    - Difficulty concentrating or mind going blank.    - Irritability.    - Muscle tension.    - Sleep disturbance (difficulty falling or staying asleep, or restless unsatisfying sleep).   D. The focus of the anxiety and worry is not confined to features of an Axis I disorder.  E. The anxiety, worry, or physical symptoms cause clinically significant distress or impairment in social, occupational, or other important areas of functioning.   F. The disturbance is not due to the direct physiological effects of a substance (e.g., a drug of abuse, a medication) or a general medical condition (e.g., hyperthyroidism) and does not occur exclusively during a Mood Disorder, a Psychotic Disorder, or a Pervasive Developmental Disorder.    - The aformentioned symptoms began 3-4 year(s) ago and occurs 7 days per week and is experienced as severe. Major Depressive Disorder  CRITERIA (A-C) REPRESENT A MAJOR DEPRESSIVE EPISODE - SELECT THESE CRITERIA  A) Recurrent episode(s) - symptoms have been present during the same 2-week period and represent a change from previous functioning 5 or more symptoms (required for diagnosis)   - Depressed mood. Note: In children and adolescents, can be irritable mood.     - Diminished interest or pleasure in all, or almost all, activities.    - Psychomotor activity agitation.    - Fatigue or loss of energy.    - Feelings of worthlessness or inappropriate and excessive guilt.    - Diminished ability to think or concentrate, or indecisiveness.    - Recurrent thoughts of death (not just fear of dying), recurrent suicidal ideation without a specific plan, or a suicide attempt or a specific plan for committing suicide.   B) The symptoms cause clinically significant distress or impairment in social, occupational, or other important areas of functioning  C) The episode is not attributable to the physiological effects of a  substance or to another medical condition  D) The occurence of major depressive episode is not better explained by other thought / psychotic disorders  E) There has never been a manic episode or hypomanic episode    Functional Status:  Patient reports the following functional impairments:  health maintenance, relationship(s), self-care, and social interactions.     Nonprogrammatic care:  Patient is requesting basic services to address current mental health concerns.    Clinical Summary:  1. Psychosocial, Cultural and Contextual Factors: stress in important relationships, stress in important roles, loss of important relationships, relational changes  .  2. Principal DSM5 Diagnoses  (Sustained by DSM5 Criteria Listed Above):   296.33 (F33.2) Major Depressive Disorder, Recurrent Episode, Severe _  300.02 (F41.1) Generalized Anxiety Disorder.  3. Other Diagnoses that is relevant to services:   None made at the time of the assessment.  4. Provisional Diagnosis/Rule Out Diagnosis:  309.81 (F43.10) Posttraumatic Stress Disorder (includes Posttraumatic Stress Disorder for Children 6 Years and Younger)  Without dissociative symptoms as evidenced by reporting experiencing significant trauma at various points in her life, as well as significant anxiety symptoms. Not enough information was able to be gathered from pt for this dx during the initial appointment. Provider will continue to monitor for any change in symptoms and update dx as needed per pt symptom report .  5. Prognosis: Expect Improvement.  6. Likely consequences of symptoms if not treated: pt may continue to experience mental health symptoms, which may worsen if untreated, and may lead to increased stress in various relationships.  7. Client strengths include:  caring, educated, empathetic, employed, goal-focused, good listener, insightful, intelligent, motivated, open to learning, open to suggestions / feedback, responsible parent, supportive, wants to learn,  willing to ask questions, and work history .     Recommendations:     1. Plan for Safety and Risk Management:   Safety and Risk: A safety and risk management plan has been developed including: Patient consented to co-developed safety plan.  Safety and risk management plan was completed - see below.  Patient agreed to use safety plan should any safety concerns arise.  A copy was given to the patient..          Report to child / adult protection services was NA.     2. Patient's identified rocael / Advent / spiritual influences will be incorporated into care by encouraging pt to bring rocael values and perspectives into the therapy room .     3. Initial Treatment will focus on:    Depressed Mood - reducing depressive symptoms and increasing engagement in activities enjoyed  Anxiety - increasing knowledge of anxiety coping skills .     4. Resources/Service Plan:    services are not indicated.   Modifications to assist communication are not indicated.   Additional disability accommodations are not indicated.      5. Collaboration:   Collaboration / coordination of treatment will be initiated with the following  support professionals:  None made at the time of assessment .      6.  Referrals:   The following referral(s) will be initiated: None made at the time of assessment.       A Release of Information has been obtained for the following: None made at the time of assessment.     Clinical Substantiation/medical necessity for the above recommendations:  None made at the time of assessment.    7. MARTIR:    MARTIR:  Discussed the general effects of drugs and alcohol on health and well-being. Provider gave patient printed information about the  effects of chemical use on their health and well being. Recommendations:  Do not increased substance use and discuss any changes in substance use with provider .     8. Records:   These were reviewed at time of assessment.   Information in this assessment was obtained from the  "medical record and  provided by patient who is a good historian.    Patient will have open access to their mental health medical record.    9.   Interactive Complexity: No    10. Safety Plan:   See below    Provider Name/ Credentials:  Trice Ricci, Psychotherapist Trainee, [facility]  October 16, 2024    This note has been reviewed and I agree with the plan of care. This note is co-signed by Jennifer De Luna MA, [facility] Supervisor, on: 10/21/2024    _____________________________________________________________________________________    ,     [facility]                                       Pretty Thomason     SAFETY PLAN:  Step 1: Warning signs / cues (Thoughts, images, mood, situation, behavior) that a crisis may be developing:  Thoughts: \"I don't matter\", \"People would be better off without me\", \"I'm a burden\", \"I can't do this anymore\", and \"I just want this to end\"  Images: flashbacks  Thinking Processes: ruminations (can't stop thinking about my problems): stressors , racing thoughts, and highly critical and negative thoughts: toward self  Mood: worsening depression, hopelessness, helplessness, and intense worry  Behaviors: isolating/withdrawing , can't stop crying, and not taking care of myself  Situations: public shame: community related shame due to mental health and trauma    Step 2: Coping strategies - Things I can do to take my mind off of my problems without contacting another person (relaxation technique, physical activity):  Distress Tolerance Strategies:  relaxation activities: go for a walk, listen to positive and upbeat music:  , sensory based activities/self-soothe with five senses: 5-4-3-2-1, watch a funny movie: with kids, pray, change body temperature (ice pack/cold water) , paced breathing/progressive muscle relaxation, and be around kids  Physical Activities: go for a walk, meditation, deep breathing, and stretching   Focus on helpful thoughts:  \"This is temporary\", \"I will get " "through this\", \"It always passes\", and \"Ride the wave\"  Step 3: People and social settings that provide distraction:   Name: Partner   Phone: Pt has phone number in her phone   Name: Mom   Phone: Pt has phone number in her phone  movie theater, coffee shop, park, library, volunteering, community center, gym , and Taoism    Step 4: Remind myself of people and things that are important to me and worth living for:  \"my children and my family\".      Step 5: When I am in crisis, I can ask these people to help me use my safety plan:   Name: Partner Phone: Pt has phone number in her phone    Step 6: Making the environment safe:   be around others  Step 7: Professionals or agencies I can contact during a crisis:  Suicide Prevention Lifeline: Call or Text 319   Local Crisis Services: [facility] Crisis Line [phone]    Call 911 or go to my nearest emergency department.   I helped develop this safety plan and agree to use it when needed.  I have been given a copy of this plan.      Client signature ____Yris Thomason___________  Today s date:  10/16/2024  Completed by Provider Name/ Credentials:  Trice Ricci, Psychotherapist Trainee, [facility]  October 16, 2024  Adapted from Safety Plan Template 2008 Jeniffer Martinez and Les Downing is reprinted with the express permission of the authors.  No portion of the Safety Plan Template may be reproduced without the express, written permission.  You can contact the authors at [email or web address] or [email or web address].        "

## 2024-10-17 DIAGNOSIS — F32.A ANXIETY AND DEPRESSION: ICD-10-CM

## 2024-10-17 DIAGNOSIS — F41.9 ANXIETY AND DEPRESSION: ICD-10-CM

## 2024-10-17 RX ORDER — HYDROXYZINE HYDROCHLORIDE 25 MG/1
TABLET, FILM COATED ORAL
Qty: 180 TABLET | Refills: 1 | Status: SHIPPED | OUTPATIENT
Start: 2024-10-17

## 2024-10-17 RX ORDER — ESCITALOPRAM OXALATE 5 MG/1
5 TABLET ORAL DAILY
Qty: 90 TABLET | Refills: 1 | Status: SHIPPED | OUTPATIENT
Start: 2024-10-17

## 2024-10-24 ENCOUNTER — VIRTUAL VISIT (OUTPATIENT)
Dept: PSYCHOLOGY | Facility: CLINIC | Age: 34
End: 2024-10-24
Payer: COMMERCIAL

## 2024-10-24 DIAGNOSIS — F41.0 GENERALIZED ANXIETY DISORDER WITH PANIC ATTACKS: Primary | ICD-10-CM

## 2024-10-24 DIAGNOSIS — F33.2 SEVERE EPISODE OF RECURRENT MAJOR DEPRESSIVE DISORDER, WITHOUT PSYCHOTIC FEATURES (H): ICD-10-CM

## 2024-10-24 DIAGNOSIS — F41.1 GENERALIZED ANXIETY DISORDER WITH PANIC ATTACKS: Primary | ICD-10-CM

## 2024-10-24 PROCEDURE — 90834 PSYTX W PT 45 MINUTES: CPT | Mod: 95

## 2024-10-24 NOTE — PROGRESS NOTES
[facility]                                     Progress Note    Patient Name: Pretty Thomason  Date: 10/24/24         Service Type: Individual      Session Start Time: 8:08pm  Session End Time: 8:46am     Session Length: 38 minutes    Session #: 2    Attendees: Client attended alone    Service Modality:  Video Visit:      Provider verified identity through the following two step process.  Patient provided:  Patient is known previously to provider    Telemedicine Visit: The patient's condition can be safely assessed and treated via synchronous audio and visual telemedicine encounter.      Reason for Telemedicine Visit: Patient has requested telehealth visit and Patient convenience (e.g. access to timely appointments / distance to available provider)    Originating Site (Patient Location): Patient's home    Distant Site (Provider Location): [facility] [place]    Consent:  The patient/guardian has verbally consented to: the potential risks and benefits of telemedicine (video visit) versus in person care; bill my insurance or make self-payment for services provided; and responsibility for payment of non-covered services.     Patient would like the video invitation sent by:  My Chart    Mode of Communication:  Video Conference via [employer]well    Distant Location (Provider):  On-site    As the provider I attest to compliance with applicable laws and regulations related to telemedicine.    DATA  Interactive Complexity: No  Crisis: No        Progress Since Last Session (Related to Symptoms / Goals / Homework):   Symptoms: Improving mental health symptoms since last appointment    Homework: Achieved / completed to satisfaction      Episode of Care Goals: Satisfactory progress - PREPARATION (Decided to change - considering how); Intervened by negotiating a change plan and determining options / strategies for behavior change, identifying triggers, exploring social  "supports, and working towards setting a date to begin behavior change     Current / Ongoing Stressors and Concerns:   Pt reports ongoing stressors as navigating various relationships and roles in different areas of life. Pt reports mood since last session has included feelings of \"anxiety\" and \"hope\". Pt shared events that took place since last session and processed emotions. Pt and provider discussed events since last appointment, emotion validation, what it means to utilize her support system, introduction to polyvagal theory and TIP skills discussion. Pt appears to have gained insight into how her brain enters fight or flight mode. Pt appears to be working toward utilizing and practice skills for managing mental health symptoms. Pt denies any SI, SIB, or HI since last session. See below for plan of care until next appointment.     Treatment Objective(s) Addressed in This Session:   use at least 2 coping skills for anxiety management in the next 1 weeks  Increase interest, engagement, and pleasure in doing things  Decrease frequency and intensity of feeling down, depressed, hopeless  Feel less tired and more energy during the day   Improve concentration, focus, and mindfulness in daily activities   Feel less fidgety, restless or slow in daily activities / interpersonal interactions  increase understanding of steps in the grief process  learn & utilize at least 1 assertive communication skills weekly  Treatment plan established this appointment     Intervention:   DBT: introduction to TIP skills and discussion  Motivational Interviewing: increasing internal motivation for sustainable change, discussion of change and creating change, evoking change talk, reflecting and reframing, OARS  Polyvagal Theory: introduction to and discussion    Assessments completed prior to visit:  The following assessments were completed by patient for this visit:  PHQ9:       4/15/2024     8:53 AM 10/3/2024     8:35 AM 10/16/2024     " 9:37 AM   PHQ-9 SCORE   PHQ-9 Total Score MyChart   19 (Moderately severe depression)   PHQ-9 Total Score 0 22 19     GAD7:       4/15/2024     8:53 AM 10/3/2024     8:21 AM 10/3/2024     8:35 AM   MORGAN-7 SCORE   Total Score  16 (severe anxiety)    Total Score 0 16 16     CAGE-AID:       10/16/2024     9:53 AM   CAGE-AID Total Score   Total Score 0   Total Score MyChart 0 (A total score of 2 or greater is considered clinically significant)     PROMIS 10-Global Health (all questions and answers displayed):       10/16/2024     9:52 AM   PROMIS 10   In general, would you say your health is: Good   In general, would you say your quality of life is: Poor   In general, how would you rate your physical health? Good   In general, how would you rate your mental health, including your mood and your ability to think? Poor   In general, how would you rate your satisfaction with your social activities and relationships? Poor   In general, please rate how well you carry out your usual social activities and roles Good   To what extent are you able to carry out your everyday physical activities such as walking, climbing stairs, carrying groceries, or moving a chair? Mostly   In the past 7 days, how often have you been bothered by emotional problems such as feeling anxious, depressed, or irritable? Often   In the past 7 days, how would you rate your fatigue on average? Severe   In the past 7 days, how would you rate your pain on average, where 0 means no pain, and 10 means worst imaginable pain? 1   In general, would you say your health is: 3   In general, would you say your quality of life is: 1   In general, how would you rate your physical health? 3   In general, how would you rate your mental health, including your mood and your ability to think? 1   In general, how would you rate your satisfaction with your social activities and relationships? 1   In general, please rate how well you carry out your usual social activities and  "roles. (This includes activities at home, at work and in your community, and responsibilities as a parent, child, spouse, employee, friend, etc.) 3   To what extent are you able to carry out your everyday physical activities such as walking, climbing stairs, carrying groceries, or moving a chair? 4   In the past 7 days, how often have you been bothered by emotional problems such as feeling anxious, depressed, or irritable? 4   In the past 7 days, how would you rate your fatigue on average? 4   In the past 7 days, how would you rate your pain on average, where 0 means no pain, and 10 means worst imaginable pain? 1   Global Mental Health Score 5   Global Physical Health Score 13   PROMIS TOTAL - SUBSCORES 18     Petroleum Suicide Severity Rating Scale (Lifetime/Recent)      10/16/2024    10:10 AM   Petroleum Suicide Severity Rating (Lifetime/Recent)   Q1 Wish to be Dead (Lifetime) Y   Wish to be Dead Description (Lifetime) \"the last 10 years, when I was  to my ex, when I was trying to get , when I did get  and now. Everytime I got an eviction notice in grad school. My kids and my rocael are the only two reasons why I am here, and now my partner too.\"   1. Wish to be Dead (Past 1 Month) Y   Wish to be Dead Description (Past 1 Month) Its been consistent for the last year. It has been almost everyday for the last few months.   Q2 Non-Specific Active Suicidal Thoughts (Lifetime) N   Most Severe Ideation Rating (Lifetime) 1   Most Severe Ideation Rating (Past 1 Month) 1   Frequency (Lifetime) 1   Frequency (Past 1 Month) 4   Duration (Lifetime) 2   Duration (Past 1 Month) 2   Controllability (Lifetime) 1   Controllability (Past 1 Month) 1   Deterrents (Lifetime) 1   Deterrents (Past 1 Month) 1   Reasons for Ideation (Lifetime) 5   Reasons for Ideation (Past 1 Month) 5   Actual Attempt (Lifetime) N   Has subject engaged in non-suicidal self-injurious behavior? (Lifetime) N   Interrupted Attempts " (Lifetime) N   Aborted or Self-Interrupted Attempt (Lifetime) N   Preparatory Acts or Behavior (Lifetime) N   Calculated C-SSRS Risk Score (Lifetime/Recent) Low Risk         ASSESSMENT: Current Emotional / Mental Status (status of significant symptoms):   Risk status (Self / Other harm or suicidal ideation)   Patient denies current fears or concerns for personal safety.   Patient denies current or recent suicidal ideation or behaviors.   Patient denies current or recent homicidal ideation or behaviors.   Patient denies current or recent self injurious behavior or ideation.   Patient denies other safety concerns.   Patient reports there has been no change in risk factors since their last session.     Patient reports there has been no change in protective factors since their last session.     Recommended that patient call 911 or go to the local ED should there be a change in any of these risk factors     Appearance:   Appropriate    Eye Contact:   Good    Psychomotor Behavior: Normal    Attitude:   Cooperative    Orientation:   All   Speech    Rate / Production: Normal     Volume:  Normal    Mood:    Anxious  Depressed  Sad    Affect:    Appropriate  Tearful   Thought Content:  Clear    Thought Form:  Coherent  Logical    Insight:    Good      Medication Review:   No current psychiatric medications prescribed     Medication Compliance:   NA     Changes in Health Issues:   None reported     Chemical Use Review:   Substance Use: Chemical use reviewed, no active concerns identified      Tobacco Use: No current tobacco use.      Diagnosis:  1. Generalized anxiety disorder with panic attacks    2. Severe episode of recurrent major depressive disorder, without psychotic features (H)        Collateral Reports Completed:   Not Applicable    PLAN: (Patient Tasks / Therapist Tasks / Other)  Pt is to practice the TIPs skills and bring thoughts, observations and experiences to next appointment for further discussion.   Pt is to  read the information sheet on Polyvagal theory and bring thoughts to next appointment.   Pt is to practice self care as discussed in session.      Trice Ricci, Psychotherapist Trainee, [facility]  10/24/24    This note has been reviewed and I agree with the plan of care. This note is co-signed by Jennifer De Luna MA, [facility] Supervisor, on: 10/25/2024                                                           ______________________________________________________________________    Individual Treatment Plan    Patient's Name: Pretty Thomason  YOB: 1990    Date of Creation: 10/24/24   Date Treatment Plan Last Reviewed/Revised: 10/24/24    DSM5 Diagnoses: 296.33 (F33.2) Major Depressive Disorder, Recurrent Episode, Severe _ or 300.02 (F41.1) Generalized Anxiety Disorder with panic attacks  Psychosocial / Contextual Factors: stress in important relationships, stress in important roles  PROMIS (reviewed every 90 days): 18    Referral / Collaboration:  Referral to another professional/service is not indicated at this time..    Anticipated number of session for this episode of care: 9-12 sessions  Anticipation frequency of session: Every other week  Anticipated Duration of each session: 38-52 minutes  Treatment plan will be reviewed in 90 days or when goals have been changed.       MeasurableTreatment Goal(s) related to diagnosis / functional impairment(s)  Goal 1: Patient will reduce anxiety symptoms by 50% per intake MORGAN-7.    I will know I've met my goal when I am able to manage and control my worrying as well as physiological symptoms of anxiety.      Objective #A    Patient will identify 5 initial signs or symptoms of anxiety.  Status: New - Date: 10/24/24      Intervention(s)  Therapist will teach  thought and symptom identifying skills in session. Pt will be asked to utilize CBT to gain insight into symptoms between appointments and bring observations to next appointment for further discussion  .    Objective #B  Patient will use at least 6 coping skills for anxiety management in the next 12 weeks.  Status: New - Date: 10/24/24      Intervention(s)  Therapist will teach CBT, DBT, and ACT skills in session. Pt will be asked to practice skills outside of session and bring thoughts, experiences and observations to next appointment for further discussion .    Objective #C  Patient will use cognitive strategies identified in therapy to challenge anxious thoughts.  Status: New - Date: 10/24/24      Intervention(s)  Therapist will teach CBT interventions and counter statements. Pt will be asked to practice skills outside of session and bring thoughts, experiences and observations to next appointment for further discussion .      Goal 2: Patient will reduce depressive symptoms by 50% per intake PHQ-9.    I will know I've met my goal when I am able to complete tasks without lacking motivation.      Objective #A  Patient will Increase interest, engagement, and pleasure in doing things.    Status: New - Date: 10/24/24      Intervention(s)  Therapist will assign homework related to engaging in activities outside of session that she used to enjoy. Pt is to bring experiences to next appointments for further discussion and reflection.    Objective #B  Patient will Identify negative self-talk and behaviors: challenge core beliefs, myths, and actions.    Status: New - Date: 10/24/24      Intervention(s)  Therapist will teach CBT, DBT, and ACT interventions and skills. Pt will be asked to practice skills outside of session and bring thoughts, experiences and observations to next appointment for further discussion .    Objective #C  Patient will Improve concentration, focus, and mindfulness in daily activities .  Status: New - Date: 10/24/24      Intervention(s)  Therapist will teach DBT and other mindfulness based skills. Pt will be asked to practice skills outside of session and bring thoughts, experiences and observations  "to next appointment for further discussion .        Patient has reviewed and agreed to the above plan.      Trice Ricci, Psychotherapist Trainee, [facility]   October 24, 2024  ________________________________________________________________________    [facility]                                        Pretty Thomason        SAFETY PLAN:  Step 1: Warning signs / cues (Thoughts, images, mood, situation, behavior) that a crisis may be developing:  Thoughts: \"I don't matter\", \"People would be better off without me\", \"I'm a burden\", \"I can't do this anymore\", and \"I just want this to end\"  Images: flashbacks  Thinking Processes: ruminations (can't stop thinking about my problems): stressors , racing thoughts, and highly critical and negative thoughts: toward self  Mood: worsening depression, hopelessness, helplessness, and intense worry  Behaviors: isolating/withdrawing , can't stop crying, and not taking care of myself  Situations: public shame: community related shame due to mental health and trauma    Step 2: Coping strategies - Things I can do to take my mind off of my problems without contacting another person (relaxation technique, physical activity):  Distress Tolerance Strategies:  relaxation activities: go for a walk, listen to positive and upbeat music:  , sensory based activities/self-soothe with five senses: 5-4-3-2-1, watch a funny movie: with kids, pray, change body temperature (ice pack/cold water) , paced breathing/progressive muscle relaxation, and be around kids  Physical Activities: go for a walk, meditation, deep breathing, and stretching   Focus on helpful thoughts:  \"This is temporary\", \"I will get through this\", \"It always passes\", and \"Ride the wave\"  Step 3: People and social settings that provide distraction:                 Name: Partner                  Phone: Pt has phone number in her phone                 Name: Mom                      Phone: Pt has phone number in her " "phone  movie theater, coffee shop, park, library, volunteering, community center, gym , and Hindu                 Step 4: Remind myself of people and things that are important to me and worth living for:  \"my children and my family\".        Step 5: When I am in crisis, I can ask these people to help me use my safety plan:                 Name: Partner   Phone: Pt has phone number in her phone     Step 6: Making the environment safe:   be around others  Step 7: Professionals or agencies I can contact during a crisis:  Suicide Prevention Lifeline: Call or Text 260   Local Crisis Services: [facility] Crisis Line [phone]     Call 911 or go to my nearest emergency department.       I helped develop this safety plan and agree to use it when needed.  I have been given a copy of this plan.       Client signature ____Yris Thomason___________  Today s date:  10/16/2024  Completed by Provider Name/ Credentials:  Trice Ricci, Psychotherapist Trainee, [facility]                            October 16, 2024  Adapted from Safety Plan Template 2008 Jeniffer Martinez and Les Downing is reprinted with the express permission of the authors.  No portion of the Safety Plan Template may be reproduced without the express, written permission.  You can contact the authors at [email or web address] or [email or web address].       "

## 2024-10-30 ENCOUNTER — VIRTUAL VISIT (OUTPATIENT)
Dept: PSYCHOLOGY | Facility: CLINIC | Age: 34
End: 2024-10-30
Payer: COMMERCIAL

## 2024-10-30 DIAGNOSIS — F41.0 GENERALIZED ANXIETY DISORDER WITH PANIC ATTACKS: ICD-10-CM

## 2024-10-30 DIAGNOSIS — F41.1 GENERALIZED ANXIETY DISORDER WITH PANIC ATTACKS: ICD-10-CM

## 2024-10-30 DIAGNOSIS — F33.2 SEVERE EPISODE OF RECURRENT MAJOR DEPRESSIVE DISORDER, WITHOUT PSYCHOTIC FEATURES (H): Primary | ICD-10-CM

## 2024-10-30 PROCEDURE — 90832 PSYTX W PT 30 MINUTES: CPT | Mod: 95

## 2024-10-30 NOTE — PROGRESS NOTES
[facility]                                     Progress Note    Patient Name: Pretty Thomason  Date: 10/30/24         Service Type: Individual      Session Start Time: 8:01pm  Session End Time: 8:18am     Session Length: 17 minutes    Session #: 4    Attendees: Client attended alone    Service Modality:  Video Visit:      Provider verified identity through the following two step process.  Patient provided:  Patient is known previously to provider    Telemedicine Visit: The patient's condition can be safely assessed and treated via synchronous audio and visual telemedicine encounter.      Reason for Telemedicine Visit: Patient has requested telehealth visit and Patient convenience (e.g. access to timely appointments / distance to available provider)    Originating Site (Patient Location): Patient's home    Distant Site (Provider Location): Provider Remote Setting- Home Office    Consent:  The patient/guardian has verbally consented to: the potential risks and benefits of telemedicine (video visit) versus in person care; bill my insurance or make self-payment for services provided; and responsibility for payment of non-covered services.     Patient would like the video invitation sent by:  My Chart    Mode of Communication:  Video Conference via [employer]well    Distant Location (Provider):  On-site    As the provider I attest to compliance with applicable laws and regulations related to telemedicine.    DATA  Interactive Complexity: No  Crisis: No        Progress Since Last Session (Related to Symptoms / Goals / Homework):   Symptoms: Worsening mental health symptoms since last appointment    Homework: Achieved / completed to satisfaction      Episode of Care Goals: Satisfactory progress - ACTION (Actively working towards change); Intervened by reinforcing change plan / affirming steps taken     Current / Ongoing Stressors and Concerns:   Pt reports ongoing stressors as navigating various  "relationships and roles in different areas of life. Pt reports mood since last session has included feelings of \"being stuck\" and \"unsure of next steps\". Pt shared events that took place since last session and processed emotions. Pt and provider discussed the emotions of anxiety and worry, external and internal stressors, and coping skills. Pt appears to have gained insight into the importance of having her support system. Pt appears to be working toward being present and utilizing coping strategies. Pt denies any SI, SIB, or HI since last session. See below for plan of care until next appointment.    Pt had to cut the appointment short due to a last minute family need.         Treatment Objective(s) Addressed in This Session:   use cognitive strategies identified in therapy to challenge anxious thoughts  Increase interest, engagement, and pleasure in doing things  Feel less tired and more energy during the day   Improve concentration, focus, and mindfulness in daily activities   Feel less fidgety, restless or slow in daily activities / interpersonal interactions  increase understanding of steps in the grief process  learn & utilize at least 2 assertive communication skills weekly     Intervention:   CBT: creating counter statements and recognizing maladaptive thinking patterns  DBT: review of TIP skills and distress tolerance skills  Motivational Interviewing: increasing internal motivation for sustainable change, discussion of change and creating change, evoking change talk, reflecting and reframing, OARS  Polyvagal theory: brief review and short discussion    Assessments completed prior to visit:  The following assessments were completed by patient for this visit:  PHQ9:       4/15/2024     8:53 AM 10/3/2024     8:35 AM 10/16/2024     9:37 AM   PHQ-9 SCORE   PHQ-9 Total Score MyChart   19 (Moderately severe depression)   PHQ-9 Total Score 0 22 19     GAD7:       4/15/2024     8:53 AM 10/3/2024     8:21 AM " 10/3/2024     8:35 AM   MORGAN-7 SCORE   Total Score  16 (severe anxiety)    Total Score 0 16 16     CAGE-AID:       10/16/2024     9:53 AM   CAGE-AID Total Score   Total Score 0   Total Score MyChart 0 (A total score of 2 or greater is considered clinically significant)     PROMIS 10-Global Health (all questions and answers displayed):       10/16/2024     9:52 AM   PROMIS 10   In general, would you say your health is: Good   In general, would you say your quality of life is: Poor   In general, how would you rate your physical health? Good   In general, how would you rate your mental health, including your mood and your ability to think? Poor   In general, how would you rate your satisfaction with your social activities and relationships? Poor   In general, please rate how well you carry out your usual social activities and roles Good   To what extent are you able to carry out your everyday physical activities such as walking, climbing stairs, carrying groceries, or moving a chair? Mostly   In the past 7 days, how often have you been bothered by emotional problems such as feeling anxious, depressed, or irritable? Often   In the past 7 days, how would you rate your fatigue on average? Severe   In the past 7 days, how would you rate your pain on average, where 0 means no pain, and 10 means worst imaginable pain? 1   In general, would you say your health is: 3   In general, would you say your quality of life is: 1   In general, how would you rate your physical health? 3   In general, how would you rate your mental health, including your mood and your ability to think? 1   In general, how would you rate your satisfaction with your social activities and relationships? 1   In general, please rate how well you carry out your usual social activities and roles. (This includes activities at home, at work and in your community, and responsibilities as a parent, child, spouse, employee, friend, etc.) 3   To what extent are you  "able to carry out your everyday physical activities such as walking, climbing stairs, carrying groceries, or moving a chair? 4   In the past 7 days, how often have you been bothered by emotional problems such as feeling anxious, depressed, or irritable? 4   In the past 7 days, how would you rate your fatigue on average? 4   In the past 7 days, how would you rate your pain on average, where 0 means no pain, and 10 means worst imaginable pain? 1   Global Mental Health Score 5   Global Physical Health Score 13   PROMIS TOTAL - SUBSCORES 18     [place] Suicide Severity Rating Scale (Lifetime/Recent)      10/16/2024    10:10 AM   [place] Suicide Severity Rating (Lifetime/Recent)   Q1 Wish to be Dead (Lifetime) Y   Wish to be Dead Description (Lifetime) \"the last 10 years, when I was  to my ex, when I was trying to get , when I did get  and now. Everytime I got an eviction notice in grad school. My kids and my rocael are the only two reasons why I am here, and now my partner too.\"   1. Wish to be Dead (Past 1 Month) Y   Wish to be Dead Description (Past 1 Month) Its been consistent for the last year. It has been almost everyday for the last few months.   Q2 Non-Specific Active Suicidal Thoughts (Lifetime) N   Most Severe Ideation Rating (Lifetime) 1   Most Severe Ideation Rating (Past 1 Month) 1   Frequency (Lifetime) 1   Frequency (Past 1 Month) 4   Duration (Lifetime) 2   Duration (Past 1 Month) 2   Controllability (Lifetime) 1   Controllability (Past 1 Month) 1   Deterrents (Lifetime) 1   Deterrents (Past 1 Month) 1   Reasons for Ideation (Lifetime) 5   Reasons for Ideation (Past 1 Month) 5   Actual Attempt (Lifetime) N   Has subject engaged in non-suicidal self-injurious behavior? (Lifetime) N   Interrupted Attempts (Lifetime) N   Aborted or Self-Interrupted Attempt (Lifetime) N   Preparatory Acts or Behavior (Lifetime) N   Calculated C-SSRS Risk Score (Lifetime/Recent) Low Risk "         ASSESSMENT: Current Emotional / Mental Status (status of significant symptoms):   Risk status (Self / Other harm or suicidal ideation)   Patient denies current fears or concerns for personal safety.   Patient denies current or recent suicidal ideation or behaviors.   Patient denies current or recent homicidal ideation or behaviors.   Patient denies current or recent self injurious behavior or ideation.   Patient denies other safety concerns.   Patient reports there has been no change in risk factors since their last session.     Patient reports there has been no change in protective factors since their last session.     Recommended that patient call 911 or go to the local ED should there be a change in any of these risk factors     Appearance:   Appropriate    Eye Contact:   Good    Psychomotor Behavior: Normal    Attitude:   Cooperative    Orientation:   All   Speech    Rate / Production: Normal     Volume:  Normal    Mood:    Anxious  Depressed  Sad    Affect:    Appropriate  Tearful   Thought Content:  Clear    Thought Form:  Coherent  Logical    Insight:    Good      Medication Review:   No current psychiatric medications prescribed     Medication Compliance:   NA     Changes in Health Issues:   None reported     Chemical Use Review:   Substance Use: Chemical use reviewed, no active concerns identified      Tobacco Use: No current tobacco use.      Diagnosis:  1. Severe episode of recurrent major depressive disorder, without psychotic features (H)    2. Generalized anxiety disorder with panic attacks        Collateral Reports Completed:   Not Applicable    PLAN: (Patient Tasks / Therapist Tasks / Other)  Pt is to continue to practice coping skills as discussed and bring examples of use to next appointment.   Pt is to continue to practice self care and bring examples to next appointment.    Trice Ricci, Psychotherapist Trainee, [facility]  10/30/24    This note has been reviewed and I agree with the plan of  care. This note is co-signed by Jennifer De Luna MA, [facility] Supervisor, on: 10/30/2024                                                         ______________________________________________________________________    Individual Treatment Plan    Patient's Name: Pretty Thomason  YOB: 1990    Date of Creation: 10/24/24   Date Treatment Plan Last Reviewed/Revised: 10/24/24    DSM5 Diagnoses: 296.33 (F33.2) Major Depressive Disorder, Recurrent Episode, Severe _ or 300.02 (F41.1) Generalized Anxiety Disorder with panic attacks  Psychosocial / Contextual Factors: stress in important relationships, stress in important roles  PROMIS (reviewed every 90 days): 18    Referral / Collaboration:  Referral to another professional/service is not indicated at this time..    Anticipated number of session for this episode of care: 9-12 sessions  Anticipation frequency of session: Every other week  Anticipated Duration of each session: 38-52 minutes  Treatment plan will be reviewed in 90 days or when goals have been changed.       MeasurableTreatment Goal(s) related to diagnosis / functional impairment(s)  Goal 1: Patient will reduce anxiety symptoms by 50% per intake MORGAN-7.    I will know I've met my goal when I am able to manage and control my worrying as well as physiological symptoms of anxiety.      Objective #A    Patient will identify 5 initial signs or symptoms of anxiety.  Status: New - Date: 10/24/24      Intervention(s)  Therapist will teach  thought and symptom identifying skills in session. Pt will be asked to utilize CBT to gain insight into symptoms between appointments and bring observations to next appointment for further discussion .    Objective #B  Patient will use at least 6 coping skills for anxiety management in the next 12 weeks.  Status: New - Date: 10/24/24      Intervention(s)  Therapist will teach CBT, DBT, and ACT skills in session. Pt will be asked to practice skills outside of session and  bring thoughts, experiences and observations to next appointment for further discussion .    Objective #C  Patient will use cognitive strategies identified in therapy to challenge anxious thoughts.  Status: New - Date: 10/24/24      Intervention(s)  Therapist will teach CBT interventions and counter statements. Pt will be asked to practice skills outside of session and bring thoughts, experiences and observations to next appointment for further discussion .      Goal 2: Patient will reduce depressive symptoms by 50% per intake PHQ-9.    I will know I've met my goal when I am able to complete tasks without lacking motivation.      Objective #A  Patient will Increase interest, engagement, and pleasure in doing things.    Status: New - Date: 10/24/24      Intervention(s)  Therapist will assign homework related to engaging in activities outside of session that she used to enjoy. Pt is to bring experiences to next appointments for further discussion and reflection.    Objective #B  Patient will Identify negative self-talk and behaviors: challenge core beliefs, myths, and actions.    Status: New - Date: 10/24/24      Intervention(s)  Therapist will teach CBT, DBT, and ACT interventions and skills. Pt will be asked to practice skills outside of session and bring thoughts, experiences and observations to next appointment for further discussion .    Objective #C  Patient will Improve concentration, focus, and mindfulness in daily activities .  Status: New - Date: 10/24/24      Intervention(s)  Therapist will teach DBT and other mindfulness based skills. Pt will be asked to practice skills outside of session and bring thoughts, experiences and observations to next appointment for further discussion .        Patient has reviewed and agreed to the above plan.      Trice Ricci, Psychotherapist Trainee, [facility]   October 24, 2024  ________________________________________________________________________     [facility]                                        Pretty Thomason        SAFETY PLAN:  Step 1: Warning signs / cues (Thoughts, images, mood, situation, behavior) that a crisis may be developing:  Thoughts: \"I don't matter\", \"People would be better off without me\", \"I'm a burden\", \"I can't do this anymore\", and \"I just want this to end\"  Images: flashbacks  Thinking Processes: ruminations (can't stop thinking about my problems): stressors , racing thoughts, and highly critical and negative thoughts: toward self  Mood: worsening depression, hopelessness, helplessness, and intense worry  Behaviors: isolating/withdrawing , can't stop crying, and not taking care of myself  Situations: public shame: community related shame due to mental health and trauma    Step 2: Coping strategies - Things I can do to take my mind off of my problems without contacting another person (relaxation technique, physical activity):  Distress Tolerance Strategies:  relaxation activities: go for a walk, listen to positive and upbeat music:  , sensory based activities/self-soothe with five senses: 5-4-3-2-1, watch a funny movie: with kids, pray, change body temperature (ice pack/cold water) , paced breathing/progressive muscle relaxation, and be around kids  Physical Activities: go for a walk, meditation, deep breathing, and stretching   Focus on helpful thoughts:  \"This is temporary\", \"I will get through this\", \"It always passes\", and \"Ride the wave\"  Step 3: People and social settings that provide distraction:                 Name: Partner                  Phone: Pt has phone number in her phone                 Name: Mom                      Phone: Pt has phone number in her phone  movie theater, coffee shop, park, library, volunteering, community center, gym , and [employer]                 Step 4: Remind myself of people and things that are important to me and worth living for:  \"my children and my family\".        Step 5: When I am in crisis, I " can ask these people to help me use my safety plan:                 Name: Partner   Phone: Pt has phone number in her phone     Step 6: Making the environment safe:   be around others  Step 7: Professionals or agencies I can contact during a crisis:  Suicide Prevention Lifeline: Call or Text 924   Local Crisis Services: [facility] Crisis Line [phone]     Call 911 or go to my nearest emergency department.       I helped develop this safety plan and agree to use it when needed.  I have been given a copy of this plan.       Client signature ____Yris Avendanotracekendal___________  Today s date:  10/16/2024  Completed by Provider Name/ Credentials:  Trice Ricci, Psychotherapist Trainee, [facility]                            October 16, 2024  Adapted from Safety Plan Template 2008 Jeniffer Martinez and Les Downing is reprinted with the express permission of the authors.  No portion of the Safety Plan Template may be reproduced without the express, written permission.  You can contact the authors at [email or web address] or [email or web address].

## 2024-11-07 ENCOUNTER — TRANSFERRED RECORDS (OUTPATIENT)
Dept: HEALTH INFORMATION MANAGEMENT | Facility: CLINIC | Age: 34
End: 2024-11-07
Payer: COMMERCIAL

## 2024-11-08 ENCOUNTER — VIRTUAL VISIT (OUTPATIENT)
Dept: PSYCHOLOGY | Facility: CLINIC | Age: 34
End: 2024-11-08
Payer: COMMERCIAL

## 2024-11-08 DIAGNOSIS — F41.0 GENERALIZED ANXIETY DISORDER WITH PANIC ATTACKS: ICD-10-CM

## 2024-11-08 DIAGNOSIS — F41.1 GENERALIZED ANXIETY DISORDER WITH PANIC ATTACKS: ICD-10-CM

## 2024-11-08 DIAGNOSIS — F33.2 SEVERE EPISODE OF RECURRENT MAJOR DEPRESSIVE DISORDER, WITHOUT PSYCHOTIC FEATURES (H): Primary | ICD-10-CM

## 2024-11-08 PROCEDURE — 90832 PSYTX W PT 30 MINUTES: CPT | Mod: 95

## 2024-11-08 NOTE — PROGRESS NOTES
[facility]                                     Progress Note    Patient Name: Pretty Thomason  Date: 11/08/24         Service Type: Individual      Session Start Time: 9:01am  Session End Time: 9:33am     Session Length: 32 minutes    Session #: 5    Attendees: Client attended alone    Service Modality:  Video Visit:      Provider verified identity through the following two step process.  Patient provided:  Patient is known previously to provider    Telemedicine Visit: The patient's condition can be safely assessed and treated via synchronous audio and visual telemedicine encounter.      Reason for Telemedicine Visit: Patient has requested telehealth visit and Patient convenience (e.g. access to timely appointments / distance to available provider)    Originating Site (Patient Location): Patient's home    Distant Site (Provider Location): Provider Remote Setting- Home Office    Consent:  The patient/guardian has verbally consented to: the potential risks and benefits of telemedicine (video visit) versus in person care; bill my insurance or make self-payment for services provided; and responsibility for payment of non-covered services.     Patient would like the video invitation sent by:  My Chart    Mode of Communication:  Video Conference via [employer]well    Distant Location (Provider):  On-site    As the provider I attest to compliance with applicable laws and regulations related to telemedicine.    DATA  Interactive Complexity: No  Crisis: No        Progress Since Last Session (Related to Symptoms / Goals / Homework):   Symptoms: Improving insight into mental health symptoms, no change in mental health symptoms    Homework: Achieved / completed to satisfaction      Episode of Care Goals: Satisfactory progress - ACTION (Actively working towards change); Intervened by reinforcing change plan / affirming steps taken     Current / Ongoing Stressors and Concerns:   Pt reports ongoing stressors as  "navigating various relationships and roles in different areas of life. Pt reports mood since last session has included \"anxiety and fear\". Pt shared events that took place since last session and processed emotions. Pt and provider discussed her body reaction to fear and anxiety, noticing emotions in the body, coping skills for distress tolerance, and approaching self with compassion. Pt appears to have gained insight into how judgment toward herself continues to impact her ability to be present in important areas of her life. Pt appears to be working toward implementing coping skills on a regular basis. Pt denies any SI, SIB, or HI since last session. See below for plan of care until next appointment.     Treatment Objective(s) Addressed in This Session:   use cognitive strategies identified in therapy to challenge anxious thoughts  Increase interest, engagement, and pleasure in doing things  Feel less tired and more energy during the day   Improve concentration, focus, and mindfulness in daily activities   Feel less fidgety, restless or slow in daily activities / interpersonal interactions  talk to at least two others about losses and coping  practice setting boundaries 1 times in the next 3 weeks  Self compassion practice     Intervention:   CBT: creating counter statements and recognizing maladaptive thinking patterns  DBT: review of TIP skills and distress tolerance skills  Motivational Interviewing: increasing internal motivation for sustainable change, discussion of change and creating change, evoking change talk, reflecting and reframing, OARS  ACT: acceptance of the present moment, acceptance of self, values that are important to self    Assessments completed prior to visit:  The following assessments were completed by patient for this visit:  PHQ9:       4/15/2024     8:53 AM 10/3/2024     8:35 AM 10/16/2024     9:37 AM   PHQ-9 SCORE   PHQ-9 Total Score MyChart   19 (Moderately severe depression)   PHQ-9 " Total Score 0 22 19     GAD7:       4/15/2024     8:53 AM 10/3/2024     8:21 AM 10/3/2024     8:35 AM   MORGAN-7 SCORE   Total Score  16 (severe anxiety)    Total Score 0 16 16     CAGE-AID:       10/16/2024     9:53 AM   CAGE-AID Total Score   Total Score 0   Total Score MyChart 0 (A total score of 2 or greater is considered clinically significant)     PROMIS 10-Global Health (all questions and answers displayed):       10/16/2024     9:52 AM   PROMIS 10   In general, would you say your health is: Good   In general, would you say your quality of life is: Poor   In general, how would you rate your physical health? Good   In general, how would you rate your mental health, including your mood and your ability to think? Poor   In general, how would you rate your satisfaction with your social activities and relationships? Poor   In general, please rate how well you carry out your usual social activities and roles Good   To what extent are you able to carry out your everyday physical activities such as walking, climbing stairs, carrying groceries, or moving a chair? Mostly   In the past 7 days, how often have you been bothered by emotional problems such as feeling anxious, depressed, or irritable? Often   In the past 7 days, how would you rate your fatigue on average? Severe   In the past 7 days, how would you rate your pain on average, where 0 means no pain, and 10 means worst imaginable pain? 1   In general, would you say your health is: 3   In general, would you say your quality of life is: 1   In general, how would you rate your physical health? 3   In general, how would you rate your mental health, including your mood and your ability to think? 1   In general, how would you rate your satisfaction with your social activities and relationships? 1   In general, please rate how well you carry out your usual social activities and roles. (This includes activities at home, at work and in your community, and responsibilities  "as a parent, child, spouse, employee, friend, etc.) 3   To what extent are you able to carry out your everyday physical activities such as walking, climbing stairs, carrying groceries, or moving a chair? 4   In the past 7 days, how often have you been bothered by emotional problems such as feeling anxious, depressed, or irritable? 4   In the past 7 days, how would you rate your fatigue on average? 4   In the past 7 days, how would you rate your pain on average, where 0 means no pain, and 10 means worst imaginable pain? 1   Global Mental Health Score 5   Global Physical Health Score 13   PROMIS TOTAL - SUBSCORES 18     [place] Suicide Severity Rating Scale (Lifetime/Recent)      10/16/2024    10:10 AM   [place] Suicide Severity Rating (Lifetime/Recent)   Q1 Wish to be Dead (Lifetime) Y   Wish to be Dead Description (Lifetime) \"the last 10 years, when I was  to my ex, when I was trying to get , when I did get  and now. Everytime I got an eviction notice in grad school. My kids and my rocael are the only two reasons why I am here, and now my partner too.\"   1. Wish to be Dead (Past 1 Month) Y   Wish to be Dead Description (Past 1 Month) Its been consistent for the last year. It has been almost everyday for the last few months.   Q2 Non-Specific Active Suicidal Thoughts (Lifetime) N   Most Severe Ideation Rating (Lifetime) 1   Most Severe Ideation Rating (Past 1 Month) 1   Frequency (Lifetime) 1   Frequency (Past 1 Month) 4   Duration (Lifetime) 2   Duration (Past 1 Month) 2   Controllability (Lifetime) 1   Controllability (Past 1 Month) 1   Deterrents (Lifetime) 1   Deterrents (Past 1 Month) 1   Reasons for Ideation (Lifetime) 5   Reasons for Ideation (Past 1 Month) 5   Actual Attempt (Lifetime) N   Has subject engaged in non-suicidal self-injurious behavior? (Lifetime) N   Interrupted Attempts (Lifetime) N   Aborted or Self-Interrupted Attempt (Lifetime) N   Preparatory Acts or Behavior " "(Lifetime) N   Calculated C-SSRS Risk Score (Lifetime/Recent) Low Risk         ASSESSMENT: Current Emotional / Mental Status (status of significant symptoms):   Risk status (Self / Other harm or suicidal ideation)   Patient denies current fears or concerns for personal safety.   Patient denies current or recent suicidal ideation or behaviors.   Patient denies current or recent homicidal ideation or behaviors.   Patient denies current or recent self injurious behavior or ideation.   Patient denies other safety concerns.   Patient reports there has been no change in risk factors since their last session.     Patient reports there has been no change in protective factors since their last session.     Recommended that patient call 911 or go to the local ED should there be a change in any of these risk factors     Appearance:   Appropriate    Eye Contact:   Good    Psychomotor Behavior: Normal    Attitude:   Cooperative    Orientation:   All   Speech    Rate / Production: Normal     Volume:  Normal    Mood:    Anxious  Depressed  Sad    Affect:    Appropriate  Tearful   Thought Content:  Clear    Thought Form:  Coherent  Logical    Insight:    Good      Medication Review:   No current psychiatric medications prescribed     Medication Compliance:   NA     Changes in Health Issues:   None reported     Chemical Use Review:   Substance Use: Chemical use reviewed, no active concerns identified      Tobacco Use: No current tobacco use.      Diagnosis:  1. Severe episode of recurrent major depressive disorder, without psychotic features (H)    2. Generalized anxiety disorder with panic attacks          Collateral Reports Completed:   Not Applicable    PLAN: (Patient Tasks / Therapist Tasks / Other)  Pt is to continue to use TIPs skills to manage her anxiety and work on distress tolerance.   Pt is to use 5-4-3-2-1 as grounding after she has been able to regulate her body out of the \"freeze\" response.  Pt is to continue to listen " to rocael based music, chants, and spiritual leaders in the morning as a way of grounding and centering herself for the rest of her day.          Trice Ricci, Psychotherapist Trainee, [facility]  11/08/24    This note has been reviewed and I agree with the plan of care. This note is co-signed by Jennifer De Luna MA, [facility] Supervisor, on: 11/12/2024      ______________________________________________________________________    Individual Treatment Plan    Patient's Name: Pretty Thomason  YOB: 1990    Date of Creation: 10/24/24   Date Treatment Plan Last Reviewed/Revised: 10/24/24    DSM5 Diagnoses: 296.33 (F33.2) Major Depressive Disorder, Recurrent Episode, Severe _ or 300.02 (F41.1) Generalized Anxiety Disorder with panic attacks  Psychosocial / Contextual Factors: stress in important relationships, stress in important roles  PROMIS (reviewed every 90 days): 18    Referral / Collaboration:  Referral to another professional/service is not indicated at this time..    Anticipated number of session for this episode of care: 9-12 sessions  Anticipation frequency of session: Every other week  Anticipated Duration of each session: 38-52 minutes  Treatment plan will be reviewed in 90 days or when goals have been changed.       MeasurableTreatment Goal(s) related to diagnosis / functional impairment(s)  Goal 1: Patient will reduce anxiety symptoms by 50% per intake MORGAN-7.    I will know I've met my goal when I am able to manage and control my worrying as well as physiological symptoms of anxiety.      Objective #A    Patient will identify 5 initial signs or symptoms of anxiety.  Status: New - Date: 10/24/24      Intervention(s)  Therapist will teach  thought and symptom identifying skills in session. Pt will be asked to utilize CBT to gain insight into symptoms between appointments and bring observations to next appointment for further discussion .    Objective #B  Patient will use at least 6 coping skills for  anxiety management in the next 12 weeks.  Status: New - Date: 10/24/24      Intervention(s)  Therapist will teach CBT, DBT, and ACT skills in session. Pt will be asked to practice skills outside of session and bring thoughts, experiences and observations to next appointment for further discussion .    Objective #C  Patient will use cognitive strategies identified in therapy to challenge anxious thoughts.  Status: New - Date: 10/24/24      Intervention(s)  Therapist will teach CBT interventions and counter statements. Pt will be asked to practice skills outside of session and bring thoughts, experiences and observations to next appointment for further discussion .      Goal 2: Patient will reduce depressive symptoms by 50% per intake PHQ-9.    I will know I've met my goal when I am able to complete tasks without lacking motivation.      Objective #A  Patient will Increase interest, engagement, and pleasure in doing things.    Status: New - Date: 10/24/24      Intervention(s)  Therapist will assign homework related to engaging in activities outside of session that she used to enjoy. Pt is to bring experiences to next appointments for further discussion and reflection.    Objective #B  Patient will Identify negative self-talk and behaviors: challenge core beliefs, myths, and actions.    Status: New - Date: 10/24/24      Intervention(s)  Therapist will teach CBT, DBT, and ACT interventions and skills. Pt will be asked to practice skills outside of session and bring thoughts, experiences and observations to next appointment for further discussion .    Objective #C  Patient will Improve concentration, focus, and mindfulness in daily activities .  Status: New - Date: 10/24/24      Intervention(s)  Therapist will teach DBT and other mindfulness based skills. Pt will be asked to practice skills outside of session and bring thoughts, experiences and observations to next appointment for further discussion .        Patient has  "reviewed and agreed to the above plan.      Trice Ricci, Psychotherapist Trainee, [facility]   October 24, 2024  ________________________________________________________________________    [facility]                                        Pretty Thomason        SAFETY PLAN:  Step 1: Warning signs / cues (Thoughts, images, mood, situation, behavior) that a crisis may be developing:  Thoughts: \"I don't matter\", \"People would be better off without me\", \"I'm a burden\", \"I can't do this anymore\", and \"I just want this to end\"  Images: flashbacks  Thinking Processes: ruminations (can't stop thinking about my problems): stressors , racing thoughts, and highly critical and negative thoughts: toward self  Mood: worsening depression, hopelessness, helplessness, and intense worry  Behaviors: isolating/withdrawing , can't stop crying, and not taking care of myself  Situations: public shame: community related shame due to mental health and trauma    Step 2: Coping strategies - Things I can do to take my mind off of my problems without contacting another person (relaxation technique, physical activity):  Distress Tolerance Strategies:  relaxation activities: go for a walk, listen to positive and upbeat music:  , sensory based activities/self-soothe with five senses: 5-4-3-2-1, watch a funny movie: with kids, pray, change body temperature (ice pack/cold water) , paced breathing/progressive muscle relaxation, and be around kids  Physical Activities: go for a walk, meditation, deep breathing, and stretching   Focus on helpful thoughts:  \"This is temporary\", \"I will get through this\", \"It always passes\", and \"Ride the wave\"  Step 3: People and social settings that provide distraction:                 Name: Partner                  Phone: Pt has phone number in her phone                 Name: Mom                      Phone: Pt has phone number in her phone  movie theater, coffee shop, park, library, volunteering, " "community center, gym , and [employer]                 Step 4: Remind myself of people and things that are important to me and worth living for:  \"my children and my family\".        Step 5: When I am in crisis, I can ask these people to help me use my safety plan:                 Name: Partner   Phone: Pt has phone number in her phone     Step 6: Making the environment safe:   be around others  Step 7: Professionals or agencies I can contact during a crisis:  Suicide Prevention Lifeline: Call or Text 008   Local Crisis Services: [facility] Crisis Line [phone]     Call 911 or go to my nearest emergency department.       I helped develop this safety plan and agree to use it when needed.  I have been given a copy of this plan.       Client signature ____Yris Thomason___________  Today s date:  10/16/2024  Completed by Provider Name/ Credentials:  Trice Ricci, Psychotherapist Trainee, [facility]                            October 16, 2024  Adapted from Safety Plan Template 2008 Jeniffer Martinez and Les Downing is reprinted with the express permission of the authors.  No portion of the Safety Plan Template may be reproduced without the express, written permission.  You can contact the authors at [email or web address] or [email or web address].       "

## 2024-11-14 NOTE — PATIENT INSTRUCTIONS
If you have labs or imaging done, the results will automatically release in [facility] without an interpretation.  Your health care professional will review those results and send an interpretation with recommendations as soon as possible, but this may be 1-3 business days.    If you have any questions regarding your visit, please contact your care team.     [email or web address] Access Services: [phone]  [facility] CLINIC HOURS TELEPHONE NUMBER   Roxi Sepulveda, HOLLI Max-VON Hebert-VON [facility]-Surgery Scheduler  Lupe-       Monday- [place]  8:00 am-4:00 pm    Tuesday- [place]  8:00 am-4:00 pm    Wednesday- [place] 8:00 am-4:00 pm    Thursday- [place] 8:00 am-4:00 pm    Friday- [place]  8:00 am-4:00 pm [facility]  [address] [place]  [place] [address]  PH: [phone]  Fax: [phone]    Imaging Scheduling all locations  PH: [phone]     [facility] Delivery  [address] [place] [address] [address]  [place], [place] [address] [phone]    [facility]  [address] [place] [address]  PH: [phone]     **Surgeries** Our Surgery Schedulers will contact you to schedule. If you do not receive a call within 3 business days, please call [phone].  Urgent Care locations:  [facility]       Monday-Friday   10 am - 8 pm    Saturday and Sunday   9 am - 5 pm   [phone]   If you need a medication refill, please contact your pharmacy. Please allow 3 business days for your refill to be completed.  As always, Thank you for trusting us with your healthcare needs!

## 2024-11-14 NOTE — PROGRESS NOTES
IUD Removal:  SUBJECTIVE:    Is a pregnancy test required: No.  Was a consent obtained?  Yes    Pretty Thomason is a 34 year old female,, Patient's last menstrual period was 2024. who presents today for IUD removal. Her current IUD was placed 1 year ago. She has not had problems with the IUD. She requests removal of the IUD because she desires to conceive    Today's PHQ-2 Score:       4/15/2024     8:53 AM   PHQ-2 (  [employer])   Q1: Little interest or pleasure in doing things 0   Q2: Feeling down, depressed or hopeless 0   PHQ-2 Score 0       PROCEDURE:    A speculum exam was performed and the cervix was visualized. The IUD string was visualized. Using ring forceps, the string  was grasped and the IUD removed intact.    POST PROCEDURE:    The patient tolerated the procedure well. Patient was discharged in stable condition.    Call if bleeding, pain or fever occur. and Birth control counseling given.    CARLEE Ferreira CNP

## 2024-11-15 ENCOUNTER — OFFICE VISIT (OUTPATIENT)
Dept: OBGYN | Facility: CLINIC | Age: 34
End: 2024-11-15
Payer: COMMERCIAL

## 2024-11-15 VITALS
WEIGHT: 195.5 LBS | BODY MASS INDEX: 28.87 KG/M2 | HEART RATE: 84 BPM | SYSTOLIC BLOOD PRESSURE: 121 MMHG | OXYGEN SATURATION: 100 % | DIASTOLIC BLOOD PRESSURE: 72 MMHG

## 2024-11-15 DIAGNOSIS — Z30.432 ENCOUNTER FOR REMOVAL OF INTRAUTERINE CONTRACEPTIVE DEVICE: Primary | ICD-10-CM

## 2025-03-08 ENCOUNTER — HEALTH MAINTENANCE LETTER (OUTPATIENT)
Age: 35
End: 2025-03-08

## 2025-06-09 ENCOUNTER — OFFICE VISIT (OUTPATIENT)
Dept: FAMILY MEDICINE | Facility: CLINIC | Age: 35
End: 2025-06-09
Payer: COMMERCIAL

## 2025-06-09 VITALS
RESPIRATION RATE: 13 BRPM | HEART RATE: 73 BPM | WEIGHT: 196.5 LBS | HEIGHT: 69 IN | OXYGEN SATURATION: 100 % | TEMPERATURE: 97.8 F | SYSTOLIC BLOOD PRESSURE: 100 MMHG | BODY MASS INDEX: 29.1 KG/M2 | DIASTOLIC BLOOD PRESSURE: 62 MMHG

## 2025-06-09 DIAGNOSIS — F32.0 CURRENT MILD EPISODE OF MAJOR DEPRESSIVE DISORDER WITHOUT PRIOR EPISODE: ICD-10-CM

## 2025-06-09 DIAGNOSIS — N89.8 VAGINAL IRRITATION: Primary | ICD-10-CM

## 2025-06-09 LAB
CLUE CELLS: NORMAL
TRICHOMONAS, WET PREP: NORMAL
WBC'S/HIGH POWER FIELD, WET PREP: NORMAL
YEAST, WET PREP: NORMAL

## 2025-06-09 PROCEDURE — 1126F AMNT PAIN NOTED NONE PRSNT: CPT

## 2025-06-09 PROCEDURE — 87210 SMEAR WET MOUNT SALINE/INK: CPT

## 2025-06-09 PROCEDURE — 3078F DIAST BP <80 MM HG: CPT

## 2025-06-09 PROCEDURE — 99213 OFFICE O/P EST LOW 20 MIN: CPT

## 2025-06-09 PROCEDURE — 3074F SYST BP LT 130 MM HG: CPT

## 2025-06-09 ASSESSMENT — PATIENT HEALTH QUESTIONNAIRE - PHQ9
SUM OF ALL RESPONSES TO PHQ QUESTIONS 1-9: 7
10. IF YOU CHECKED OFF ANY PROBLEMS, HOW DIFFICULT HAVE THESE PROBLEMS MADE IT FOR YOU TO DO YOUR WORK, TAKE CARE OF THINGS AT HOME, OR GET ALONG WITH OTHER PEOPLE: SOMEWHAT DIFFICULT
SUM OF ALL RESPONSES TO PHQ QUESTIONS 1-9: 7

## 2025-06-09 ASSESSMENT — PAIN SCALES - GENERAL: PAINLEVEL_OUTOF10: NO PAIN (0)

## 2025-06-09 ASSESSMENT — COLUMBIA-SUICIDE SEVERITY RATING SCALE - C-SSRS
1. WITHIN THE PAST MONTH, HAVE YOU WISHED YOU WERE DEAD OR WISHED YOU COULD GO TO SLEEP AND NOT WAKE UP?: NO
6. HAVE YOU EVER DONE ANYTHING, STARTED TO DO ANYTHING, OR PREPARED TO DO ANYTHING TO END YOUR LIFE?: NO
2. IN THE PAST MONTH, HAVE YOU ACTUALLY HAD ANY THOUGHTS OF KILLING YOURSELF?: NO

## 2025-06-09 NOTE — PROGRESS NOTES
Assessment & Plan     Vaginal irritation  Patient is a 35 year-old female presenting with concerns of increased vaginal odor over the past 3 days. History of BV and yeast. Wet prep negative during encounter. Denies irritative voiding symptoms or STD concerns. Declined further urine or STD screening. Discussed hygiene measures and to reach out if symptoms progress or new symptoms develop, will order repeat labs at that time. Follow-up as needed. Patient understands and is agreeable to plan as discussed in clinic.  - Wet prep - lab collect    Current mild episode of major depressive disorder without prior episode  PHQ-9 at 7 with several days of self harm. Denies SI or intention to harm self. Feels safe at home.       Depression Screening Follow Up        6/9/2025     3:12 PM   PHQ   PHQ-9 Total Score 7    Q9: Thoughts of better off dead/self-harm past 2 weeks Several days   F/U: Thoughts of suicide or self-harm No   F/U: Safety concerns No       Patient-reported           6/9/2025     3:48 PM   C-SSRS ([place])   Within the last month, have you wished you were dead or wished you could go to sleep and not wake up? No   Within the last month, have you had any actual thoughts of killing yourself? No   Within the last month, have you ever done anything, started to do anything, or prepared to do anything to end your life? No       Follow Up Actions Taken  Patient declined referral.    Discussed the following ways the patient can remain in a safe environment:  feels safe at home    Dayne Olsen is a 35 year old, presenting for the following health issues:  Vaginal Problem        6/9/2025     3:21 PM   Additional Questions   Roomed by Altagracia SAMUELS   Accompanied by Children          6/9/2025     3:21 PM   Patient Reported Additional Medications   Patient reports taking the following new medications NA     History of Present Illness       Reason for visit:  Bv sx  Symptom onset:  1-3 days ago  Symptoms include:  Fishy  "odor  Symptom intensity:  Mild  Symptom progression:  Staying the same  Had these symptoms before:  Yes  Has tried/received treatment for these symptoms:  Yes  Previous treatment was successful:  Yes  Prior treatment description:  Oral one time pill  What makes it worse:  No  What makes it better:  Warm water   She is taking medications regularly.          Vaginal Symptoms  Onset/Duration: 3 days   Description:  Vaginal Discharge: none   Itching (Pruritis): No  Burning sensation:  No  Odor: YES  Accompanying Signs & Symptoms:  Urinary symptoms: No  Abdominal pain: No  Fever: No  History:   Sexually active: YES  New Partner: No  Possibility of Pregnancy:  Unknown- trying to conceive    Recent antibiotic use: No  Previous vaginitis issues: YES  Precipitating or alleviating factors: Stress and increased sugar- reports makes worse   Therapies tried and outcome: decrease sugar intake and increase fluids     Presents with vaginal odor for the past 3 days. History of BV and yeast infections. Denies irritative voiding symptoms, vaginal bleeding, localized abdominal pain, pelvic pain, flank pain, fever, or chills.       Objective    /62   Pulse 73   Temp 97.8  F (36.6  C) (Temporal)   Resp 13   Ht 1.753 m (5' 9.02\")   Wt 89.1 kg (196 lb 8 oz)   LMP 05/21/2025 (Within Days)   SpO2 100%   Breastfeeding No   BMI 29.00 kg/m    Body mass index is 29 kg/m .  Physical Exam  Vitals reviewed.   Constitutional:       General: She is not in acute distress.     Appearance: Normal appearance. She is not ill-appearing.   Pulmonary:      Effort: Pulmonary effort is normal. No respiratory distress.   Skin:     General: Skin is warm and dry.      Capillary Refill: Capillary refill takes less than 2 seconds.      Findings: No lesion or rash.   Neurological:      Mental Status: She is alert.   Psychiatric:         Attention and Perception: Attention and perception normal.         Mood and Affect: Mood and affect normal.      "   Results for orders placed or performed in visit on 06/09/25 (from the past 24 hours)   Wet prep - lab collect    Specimen: Vagina; Swab   Result Value Ref Range    Trichomonas Absent Absent    Yeast Absent Absent    Clue Cells Absent Absent    WBCs/high power field None None           Signed Electronically by: CARLEE Ann CNP

## 2025-06-09 NOTE — PATIENT INSTRUCTIONS
No signs of yeast or bacterial vaginosis on vaginal swab today. Keep area clean and well ventilated by wearing cotton under garments. If odor progresses or you develop other vaginal or urinary symptoms, please send me a MyChart message and I order repeat labs.

## 2025-07-31 ENCOUNTER — TELEPHONE (OUTPATIENT)
Dept: MIDWIFE SERVICES | Facility: CLINIC | Age: 35
End: 2025-07-31

## 2025-07-31 NOTE — TELEPHONE ENCOUNTER
Patient cancelled intake appointment. Please call to reschedule.  Thank you, Tenisha Vargas LPN